# Patient Record
Sex: MALE | Race: WHITE | Employment: OTHER | URBAN - METROPOLITAN AREA
[De-identification: names, ages, dates, MRNs, and addresses within clinical notes are randomized per-mention and may not be internally consistent; named-entity substitution may affect disease eponyms.]

---

## 2017-01-01 ENCOUNTER — OFFICE VISIT (OUTPATIENT)
Dept: FAMILY MEDICINE CLINIC | Facility: CLINIC | Age: 67
End: 2017-01-01

## 2017-01-01 ENCOUNTER — OFFICE VISIT (OUTPATIENT)
Dept: HEMATOLOGY/ONCOLOGY | Age: 67
End: 2017-01-01
Attending: INTERNAL MEDICINE
Payer: MEDICARE

## 2017-01-01 ENCOUNTER — SNF/IP PROF CHARGE ONLY (OUTPATIENT)
Dept: HEMATOLOGY/ONCOLOGY | Facility: HOSPITAL | Age: 67
End: 2017-01-01

## 2017-01-01 ENCOUNTER — NURSE ONLY (OUTPATIENT)
Dept: HEMATOLOGY/ONCOLOGY | Age: 67
End: 2017-01-01
Attending: INTERNAL MEDICINE
Payer: MEDICARE

## 2017-01-01 ENCOUNTER — TELEPHONE (OUTPATIENT)
Dept: HEMATOLOGY/ONCOLOGY | Facility: HOSPITAL | Age: 67
End: 2017-01-01

## 2017-01-01 ENCOUNTER — HOSPITAL ENCOUNTER (OUTPATIENT)
Dept: CT IMAGING | Age: 67
Discharge: HOME | End: 2017-01-01
Attending: INTERNAL MEDICINE
Payer: MEDICARE

## 2017-01-01 ENCOUNTER — HOSPITAL ENCOUNTER (OUTPATIENT)
Dept: CT IMAGING | Facility: HOSPITAL | Age: 67
Discharge: HOME | End: 2017-01-01
Attending: INTERNAL MEDICINE
Payer: MEDICARE

## 2017-01-01 ENCOUNTER — APPOINTMENT (OUTPATIENT)
Dept: HEMATOLOGY/ONCOLOGY | Age: 67
End: 2017-01-01
Attending: INTERNAL MEDICINE
Payer: MEDICARE

## 2017-01-01 ENCOUNTER — LAB ENCOUNTER (OUTPATIENT)
Dept: LAB | Facility: HOSPITAL | Age: 67
End: 2017-01-01
Attending: INTERNAL MEDICINE
Payer: MEDICARE

## 2017-01-01 ENCOUNTER — OFFICE VISIT (OUTPATIENT)
Dept: HEMATOLOGY/ONCOLOGY | Facility: HOSPITAL | Age: 67
End: 2017-01-01
Attending: INTERNAL MEDICINE
Payer: MEDICARE

## 2017-01-01 VITALS
RESPIRATION RATE: 18 BRPM | DIASTOLIC BLOOD PRESSURE: 89 MMHG | OXYGEN SATURATION: 98 % | BODY MASS INDEX: 25 KG/M2 | WEIGHT: 171 LBS | HEART RATE: 97 BPM | SYSTOLIC BLOOD PRESSURE: 145 MMHG | TEMPERATURE: 97 F

## 2017-01-01 VITALS
OXYGEN SATURATION: 98 % | WEIGHT: 167.81 LBS | WEIGHT: 156.38 LBS | SYSTOLIC BLOOD PRESSURE: 133 MMHG | BODY MASS INDEX: 22.9 KG/M2 | DIASTOLIC BLOOD PRESSURE: 86 MMHG | HEART RATE: 75 BPM | RESPIRATION RATE: 18 BRPM | HEIGHT: 69.29 IN | DIASTOLIC BLOOD PRESSURE: 82 MMHG | RESPIRATION RATE: 16 BRPM | HEIGHT: 69.29 IN | BODY MASS INDEX: 24.57 KG/M2 | TEMPERATURE: 97 F | TEMPERATURE: 98 F | OXYGEN SATURATION: 99 % | SYSTOLIC BLOOD PRESSURE: 147 MMHG | HEART RATE: 86 BPM

## 2017-01-01 VITALS
WEIGHT: 167 LBS | OXYGEN SATURATION: 96 % | DIASTOLIC BLOOD PRESSURE: 66 MMHG | RESPIRATION RATE: 18 BRPM | HEART RATE: 93 BPM | SYSTOLIC BLOOD PRESSURE: 110 MMHG | HEIGHT: 72 IN | TEMPERATURE: 99 F | BODY MASS INDEX: 22.62 KG/M2

## 2017-01-01 VITALS
TEMPERATURE: 98 F | SYSTOLIC BLOOD PRESSURE: 137 MMHG | BODY MASS INDEX: 23.99 KG/M2 | RESPIRATION RATE: 18 BRPM | OXYGEN SATURATION: 99 % | DIASTOLIC BLOOD PRESSURE: 83 MMHG | HEIGHT: 69.29 IN | WEIGHT: 163.81 LBS | HEART RATE: 96 BPM

## 2017-01-01 VITALS
BODY MASS INDEX: 25 KG/M2 | WEIGHT: 173.38 LBS | SYSTOLIC BLOOD PRESSURE: 126 MMHG | HEART RATE: 80 BPM | RESPIRATION RATE: 16 BRPM | DIASTOLIC BLOOD PRESSURE: 76 MMHG | TEMPERATURE: 98 F | OXYGEN SATURATION: 99 %

## 2017-01-01 VITALS
HEART RATE: 94 BPM | SYSTOLIC BLOOD PRESSURE: 137 MMHG | DIASTOLIC BLOOD PRESSURE: 81 MMHG | RESPIRATION RATE: 18 BRPM | OXYGEN SATURATION: 96 % | WEIGHT: 171.88 LBS | BODY MASS INDEX: 25 KG/M2 | TEMPERATURE: 98 F

## 2017-01-01 VITALS
OXYGEN SATURATION: 99 % | SYSTOLIC BLOOD PRESSURE: 121 MMHG | HEART RATE: 109 BPM | RESPIRATION RATE: 18 BRPM | BODY MASS INDEX: 25 KG/M2 | WEIGHT: 168 LBS | DIASTOLIC BLOOD PRESSURE: 80 MMHG | TEMPERATURE: 100 F

## 2017-01-01 VITALS
WEIGHT: 172.81 LBS | BODY MASS INDEX: 25 KG/M2 | TEMPERATURE: 98 F | RESPIRATION RATE: 18 BRPM | HEART RATE: 82 BPM | OXYGEN SATURATION: 97 % | SYSTOLIC BLOOD PRESSURE: 128 MMHG | DIASTOLIC BLOOD PRESSURE: 78 MMHG

## 2017-01-01 VITALS
WEIGHT: 163.5 LBS | SYSTOLIC BLOOD PRESSURE: 107 MMHG | RESPIRATION RATE: 16 BRPM | BODY MASS INDEX: 24 KG/M2 | OXYGEN SATURATION: 99 % | HEART RATE: 93 BPM | DIASTOLIC BLOOD PRESSURE: 67 MMHG | TEMPERATURE: 97 F

## 2017-01-01 VITALS
OXYGEN SATURATION: 97 % | DIASTOLIC BLOOD PRESSURE: 90 MMHG | RESPIRATION RATE: 16 BRPM | HEART RATE: 90 BPM | BODY MASS INDEX: 25 KG/M2 | SYSTOLIC BLOOD PRESSURE: 162 MMHG | WEIGHT: 173 LBS

## 2017-01-01 VITALS
DIASTOLIC BLOOD PRESSURE: 69 MMHG | HEIGHT: 69.29 IN | OXYGEN SATURATION: 98 % | TEMPERATURE: 98 F | SYSTOLIC BLOOD PRESSURE: 105 MMHG | HEART RATE: 97 BPM | BODY MASS INDEX: 23.87 KG/M2 | WEIGHT: 163 LBS | RESPIRATION RATE: 16 BRPM

## 2017-01-01 VITALS
DIASTOLIC BLOOD PRESSURE: 87 MMHG | SYSTOLIC BLOOD PRESSURE: 157 MMHG | HEART RATE: 81 BPM | WEIGHT: 174 LBS | TEMPERATURE: 97 F | RESPIRATION RATE: 18 BRPM | OXYGEN SATURATION: 97 % | BODY MASS INDEX: 25 KG/M2

## 2017-01-01 VITALS
OXYGEN SATURATION: 100 % | TEMPERATURE: 98 F | DIASTOLIC BLOOD PRESSURE: 90 MMHG | BODY MASS INDEX: 24 KG/M2 | RESPIRATION RATE: 18 BRPM | HEART RATE: 88 BPM | WEIGHT: 164.19 LBS | SYSTOLIC BLOOD PRESSURE: 151 MMHG

## 2017-01-01 VITALS
BODY MASS INDEX: 25.02 KG/M2 | WEIGHT: 170.88 LBS | DIASTOLIC BLOOD PRESSURE: 85 MMHG | HEART RATE: 78 BPM | TEMPERATURE: 98 F | SYSTOLIC BLOOD PRESSURE: 150 MMHG | OXYGEN SATURATION: 97 % | HEIGHT: 69.29 IN | RESPIRATION RATE: 16 BRPM

## 2017-01-01 VITALS
DIASTOLIC BLOOD PRESSURE: 76 MMHG | RESPIRATION RATE: 16 BRPM | HEART RATE: 89 BPM | OXYGEN SATURATION: 100 % | SYSTOLIC BLOOD PRESSURE: 118 MMHG | WEIGHT: 156.88 LBS | TEMPERATURE: 98 F | BODY MASS INDEX: 23 KG/M2

## 2017-01-01 VITALS
TEMPERATURE: 97 F | WEIGHT: 167.38 LBS | DIASTOLIC BLOOD PRESSURE: 83 MMHG | OXYGEN SATURATION: 99 % | HEIGHT: 69.29 IN | HEART RATE: 79 BPM | BODY MASS INDEX: 24.51 KG/M2 | SYSTOLIC BLOOD PRESSURE: 131 MMHG | RESPIRATION RATE: 18 BRPM

## 2017-01-01 VITALS
BODY MASS INDEX: 22 KG/M2 | TEMPERATURE: 98 F | HEART RATE: 91 BPM | WEIGHT: 163 LBS | RESPIRATION RATE: 18 BRPM | DIASTOLIC BLOOD PRESSURE: 81 MMHG | SYSTOLIC BLOOD PRESSURE: 127 MMHG

## 2017-01-01 VITALS
SYSTOLIC BLOOD PRESSURE: 150 MMHG | BODY MASS INDEX: 25.19 KG/M2 | TEMPERATURE: 97 F | HEART RATE: 107 BPM | RESPIRATION RATE: 18 BRPM | OXYGEN SATURATION: 96 % | WEIGHT: 172 LBS | HEIGHT: 69.29 IN | DIASTOLIC BLOOD PRESSURE: 89 MMHG

## 2017-01-01 VITALS
SYSTOLIC BLOOD PRESSURE: 124 MMHG | WEIGHT: 172.19 LBS | BODY MASS INDEX: 25 KG/M2 | OXYGEN SATURATION: 97 % | DIASTOLIC BLOOD PRESSURE: 73 MMHG | HEART RATE: 79 BPM | RESPIRATION RATE: 18 BRPM | TEMPERATURE: 98 F

## 2017-01-01 DIAGNOSIS — C19 CANCER OF RECTOSIGMOID (COLON) (HCC): ICD-10-CM

## 2017-01-01 DIAGNOSIS — C78.7 LIVER METASTASES (HCC): ICD-10-CM

## 2017-01-01 DIAGNOSIS — C78.7 COLON CANCER METASTASIZED TO LIVER (HCC): ICD-10-CM

## 2017-01-01 DIAGNOSIS — C18.8 OVERLAPPING MALIGNANT NEOPLASM OF COLON (HCC): Primary | ICD-10-CM

## 2017-01-01 DIAGNOSIS — C79.51 BONE METASTASES (HCC): ICD-10-CM

## 2017-01-01 DIAGNOSIS — C18.9 MALIGNANT NEOPLASM OF COLON, UNSPECIFIED PART OF COLON (HCC): ICD-10-CM

## 2017-01-01 DIAGNOSIS — T45.1X5A CHEMOTHERAPY-INDUCED THROMBOCYTOPENIA: ICD-10-CM

## 2017-01-01 DIAGNOSIS — C18.9 COLON CANCER METASTASIZED TO LIVER (HCC): ICD-10-CM

## 2017-01-01 DIAGNOSIS — I26.99 OTHER ACUTE PULMONARY EMBOLISM WITHOUT ACUTE COR PULMONALE (HCC): ICD-10-CM

## 2017-01-01 DIAGNOSIS — C18.9 COLON CANCER METASTASIZED TO LIVER (HCC): Primary | ICD-10-CM

## 2017-01-01 DIAGNOSIS — C18.8 OVERLAPPING MALIGNANT NEOPLASM OF COLON (HCC): ICD-10-CM

## 2017-01-01 DIAGNOSIS — C19 CANCER OF RECTOSIGMOID (COLON) (HCC): Primary | ICD-10-CM

## 2017-01-01 DIAGNOSIS — C78.7 COLON CANCER METASTASIZED TO LIVER (HCC): Primary | ICD-10-CM

## 2017-01-01 DIAGNOSIS — D69.59 CHEMOTHERAPY-INDUCED THROMBOCYTOPENIA: ICD-10-CM

## 2017-01-01 DIAGNOSIS — I27.82 CHRONIC SADDLE PULMONARY EMBOLUS WITHOUT ACUTE COR PULMONALE (HCC): ICD-10-CM

## 2017-01-01 DIAGNOSIS — C18.9 MALIGNANT NEOPLASM OF COLON, UNSPECIFIED PART OF COLON (HCC): Primary | ICD-10-CM

## 2017-01-01 DIAGNOSIS — C79.51 BONE METASTASES (HCC): Primary | ICD-10-CM

## 2017-01-01 DIAGNOSIS — T45.1X5A ANEMIA ASSOCIATED WITH CHEMOTHERAPY: ICD-10-CM

## 2017-01-01 DIAGNOSIS — R04.0 EPISTAXIS: ICD-10-CM

## 2017-01-01 DIAGNOSIS — M25.512 CHRONIC LEFT SHOULDER PAIN: ICD-10-CM

## 2017-01-01 DIAGNOSIS — E83.42 HYPOMAGNESEMIA: ICD-10-CM

## 2017-01-01 DIAGNOSIS — E83.42 HYPOMAGNESEMIA: Primary | ICD-10-CM

## 2017-01-01 DIAGNOSIS — T45.1X5A ANEMIA DUE TO ANTINEOPLASTIC CHEMOTHERAPY: ICD-10-CM

## 2017-01-01 DIAGNOSIS — D64.81 ANEMIA ASSOCIATED WITH CHEMOTHERAPY: ICD-10-CM

## 2017-01-01 DIAGNOSIS — C78.7 LIVER METASTASES (HCC): Primary | ICD-10-CM

## 2017-01-01 DIAGNOSIS — D69.6 THROMBOCYTOPENIA (HCC): ICD-10-CM

## 2017-01-01 DIAGNOSIS — D63.0 ANEMIA IN NEOPLASTIC DISEASE: ICD-10-CM

## 2017-01-01 DIAGNOSIS — I27.82 OTHER CHRONIC PULMONARY EMBOLISM WITHOUT ACUTE COR PULMONALE (HCC): ICD-10-CM

## 2017-01-01 DIAGNOSIS — I10 HTN, GOAL BELOW 130/80: ICD-10-CM

## 2017-01-01 DIAGNOSIS — Z86.711 HISTORY OF PULMONARY EMBOLISM: ICD-10-CM

## 2017-01-01 DIAGNOSIS — G89.29 CHRONIC LEFT SHOULDER PAIN: ICD-10-CM

## 2017-01-01 DIAGNOSIS — Z01.89 ROUTINE LAB DRAW: Primary | ICD-10-CM

## 2017-01-01 DIAGNOSIS — T50.905A ADVERSE DRUG REACTION, INITIAL ENCOUNTER: ICD-10-CM

## 2017-01-01 DIAGNOSIS — Z79.01 CHRONIC ANTICOAGULATION: ICD-10-CM

## 2017-01-01 DIAGNOSIS — D64.81 ANEMIA DUE TO ANTINEOPLASTIC CHEMOTHERAPY: ICD-10-CM

## 2017-01-01 DIAGNOSIS — I27.82 OTHER CHRONIC PULMONARY EMBOLISM WITHOUT ACUTE COR PULMONALE (HCC): Primary | ICD-10-CM

## 2017-01-01 DIAGNOSIS — I26.92 CHRONIC SADDLE PULMONARY EMBOLUS WITHOUT ACUTE COR PULMONALE (HCC): ICD-10-CM

## 2017-01-01 LAB
ALBUMIN SERPL-MCNC: 3.1 G/DL (ref 3.5–4.8)
ALBUMIN SERPL-MCNC: 3.2 G/DL (ref 3.5–4.8)
ALBUMIN SERPL-MCNC: 3.3 G/DL (ref 3.5–4.8)
ALBUMIN SERPL-MCNC: 3.4 G/DL (ref 3.5–4.8)
ALBUMIN SERPL-MCNC: 3.5 G/DL (ref 3.5–4.8)
ALP LIVER SERPL-CCNC: 236 U/L (ref 45–117)
ALP LIVER SERPL-CCNC: 240 U/L (ref 45–117)
ALP LIVER SERPL-CCNC: 243 U/L (ref 45–117)
ALP LIVER SERPL-CCNC: 244 U/L (ref 45–117)
ALP LIVER SERPL-CCNC: 256 U/L (ref 45–117)
ALP LIVER SERPL-CCNC: 257 U/L (ref 45–117)
ALP LIVER SERPL-CCNC: 298 U/L (ref 45–117)
ALT SERPL-CCNC: 20 U/L (ref 17–63)
ALT SERPL-CCNC: 22 U/L (ref 17–63)
ALT SERPL-CCNC: 23 U/L (ref 17–63)
ALT SERPL-CCNC: 24 U/L (ref 17–63)
ALT SERPL-CCNC: 26 U/L (ref 17–63)
AST SERPL-CCNC: 21 U/L (ref 15–41)
AST SERPL-CCNC: 21 U/L (ref 15–41)
AST SERPL-CCNC: 22 U/L (ref 15–41)
AST SERPL-CCNC: 24 U/L (ref 15–41)
AST SERPL-CCNC: 25 U/L (ref 15–41)
AST SERPL-CCNC: 25 U/L (ref 15–41)
AST SERPL-CCNC: 26 U/L (ref 15–41)
BASOPHILS # BLD AUTO: 0.03 X10(3) UL (ref 0–0.1)
BASOPHILS # BLD AUTO: 0.04 X10(3) UL (ref 0–0.1)
BASOPHILS # BLD AUTO: 0.04 X10(3) UL (ref 0–0.1)
BASOPHILS NFR BLD AUTO: 0.4 %
BASOPHILS NFR BLD AUTO: 0.5 %
BASOPHILS NFR BLD AUTO: 0.5 %
BASOPHILS NFR BLD AUTO: 0.7 %
BASOPHILS NFR BLD AUTO: 0.7 %
BILIRUB SERPL-MCNC: 0.3 MG/DL (ref 0.1–2)
BILIRUB SERPL-MCNC: 0.4 MG/DL (ref 0.1–2)
BILIRUB SERPL-MCNC: 0.5 MG/DL (ref 0.1–2)
BUN BLD-MCNC: 10 MG/DL (ref 8–20)
BUN BLD-MCNC: 11 MG/DL (ref 8–20)
BUN BLD-MCNC: 16 MG/DL (ref 8–20)
BUN BLD-MCNC: 7 MG/DL (ref 8–20)
BUN BLD-MCNC: 9 MG/DL (ref 8–20)
CALCIUM BLD-MCNC: 8.7 MG/DL (ref 8.3–10.3)
CALCIUM BLD-MCNC: 8.8 MG/DL (ref 8.3–10.3)
CALCIUM BLD-MCNC: 9 MG/DL (ref 8.3–10.3)
CALCIUM BLD-MCNC: 9.1 MG/DL (ref 8.3–10.3)
CALCIUM BLD-MCNC: 9.3 MG/DL (ref 8.3–10.3)
CEA: 1730 NG/ML (ref 0.5–5)
CEA: 412.1 NG/ML (ref 0.5–5)
CEA: 450 NG/ML (ref 0.5–5)
CEA: 505.2 NG/ML (ref 0.5–5)
CEA: 595.3 NG/ML (ref 0.5–5)
CEA: 667.8 NG/ML (ref 0.5–5)
CEA: 987.3 NG/ML (ref 0.5–5)
CHLORIDE: 108 MMOL/L (ref 101–111)
CHLORIDE: 109 MMOL/L (ref 101–111)
CHLORIDE: 110 MMOL/L (ref 101–111)
CHLORIDE: 111 MMOL/L (ref 101–111)
CHLORIDE: 111 MMOL/L (ref 101–111)
CO2: 24 MMOL/L (ref 22–32)
CO2: 25 MMOL/L (ref 22–32)
CO2: 26 MMOL/L (ref 22–32)
CO2: 27 MMOL/L (ref 22–32)
CREAT BLD-MCNC: 0.66 MG/DL (ref 0.7–1.3)
CREAT BLD-MCNC: 0.66 MG/DL (ref 0.7–1.3)
CREAT BLD-MCNC: 0.68 MG/DL (ref 0.7–1.3)
CREAT BLD-MCNC: 0.7 MG/DL (ref 0.7–1.3)
CREAT BLD-MCNC: 0.71 MG/DL (ref 0.7–1.3)
CREAT BLD-MCNC: 0.72 MG/DL (ref 0.7–1.3)
CREAT BLD-MCNC: 0.81 MG/DL (ref 0.7–1.3)
EOSINOPHIL # BLD AUTO: 0.11 X10(3) UL (ref 0–0.3)
EOSINOPHIL # BLD AUTO: 0.15 X10(3) UL (ref 0–0.3)
EOSINOPHIL # BLD AUTO: 0.17 X10(3) UL (ref 0–0.3)
EOSINOPHIL # BLD AUTO: 0.18 X10(3) UL (ref 0–0.3)
EOSINOPHIL # BLD AUTO: 0.21 X10(3) UL (ref 0–0.3)
EOSINOPHIL # BLD AUTO: 0.25 X10(3) UL (ref 0–0.3)
EOSINOPHIL # BLD AUTO: 0.27 X10(3) UL (ref 0–0.3)
EOSINOPHIL NFR BLD AUTO: 1.8 %
EOSINOPHIL NFR BLD AUTO: 2.5 %
EOSINOPHIL NFR BLD AUTO: 2.9 %
EOSINOPHIL NFR BLD AUTO: 2.9 %
EOSINOPHIL NFR BLD AUTO: 3.3 %
EOSINOPHIL NFR BLD AUTO: 3.5 %
EOSINOPHIL NFR BLD AUTO: 3.7 %
ERYTHROCYTE [DISTWIDTH] IN BLOOD BY AUTOMATED COUNT: 17.7 % (ref 11.5–16)
ERYTHROCYTE [DISTWIDTH] IN BLOOD BY AUTOMATED COUNT: 17.9 % (ref 11.5–16)
ERYTHROCYTE [DISTWIDTH] IN BLOOD BY AUTOMATED COUNT: 18.9 % (ref 11.5–16)
ERYTHROCYTE [DISTWIDTH] IN BLOOD BY AUTOMATED COUNT: 19 % (ref 11.5–16)
ERYTHROCYTE [DISTWIDTH] IN BLOOD BY AUTOMATED COUNT: 19.1 % (ref 11.5–16)
ERYTHROCYTE [DISTWIDTH] IN BLOOD BY AUTOMATED COUNT: 19.2 % (ref 11.5–16)
ERYTHROCYTE [DISTWIDTH] IN BLOOD BY AUTOMATED COUNT: 19.4 % (ref 11.5–16)
GLUCOSE BLD-MCNC: 100 MG/DL (ref 70–99)
GLUCOSE BLD-MCNC: 119 MG/DL (ref 70–99)
GLUCOSE BLD-MCNC: 147 MG/DL (ref 70–99)
GLUCOSE BLD-MCNC: 152 MG/DL (ref 70–99)
GLUCOSE BLD-MCNC: 154 MG/DL (ref 70–99)
GLUCOSE BLD-MCNC: 88 MG/DL (ref 70–99)
GLUCOSE BLD-MCNC: 96 MG/DL (ref 70–99)
HCT VFR BLD AUTO: 34.7 % (ref 37–53)
HCT VFR BLD AUTO: 35.5 % (ref 37–53)
HCT VFR BLD AUTO: 35.5 % (ref 37–53)
HCT VFR BLD AUTO: 35.6 % (ref 37–53)
HCT VFR BLD AUTO: 36.5 % (ref 37–53)
HCT VFR BLD AUTO: 36.7 % (ref 37–53)
HCT VFR BLD AUTO: 37.2 % (ref 37–53)
HGB BLD-MCNC: 11.4 G/DL (ref 13–17)
HGB BLD-MCNC: 11.6 G/DL (ref 13–17)
HGB BLD-MCNC: 11.8 G/DL (ref 13–17)
HGB BLD-MCNC: 12 G/DL (ref 13–17)
HGB BLD-MCNC: 12 G/DL (ref 13–17)
HGB BLD-MCNC: 12.1 G/DL (ref 13–17)
HGB BLD-MCNC: 12.4 G/DL (ref 13–17)
IMMATURE GRANULOCYTE COUNT: 0.01 X10(3) UL (ref 0–1)
IMMATURE GRANULOCYTE COUNT: 0.02 X10(3) UL (ref 0–1)
IMMATURE GRANULOCYTE RATIO %: 0.1 %
IMMATURE GRANULOCYTE RATIO %: 0.1 %
IMMATURE GRANULOCYTE RATIO %: 0.2 %
IMMATURE GRANULOCYTE RATIO %: 0.3 %
LYMPHOCYTES # BLD AUTO: 1.16 X10(3) UL (ref 0.9–4)
LYMPHOCYTES # BLD AUTO: 1.24 X10(3) UL (ref 0.9–4)
LYMPHOCYTES # BLD AUTO: 1.26 X10(3) UL (ref 0.9–4)
LYMPHOCYTES # BLD AUTO: 1.5 X10(3) UL (ref 0.9–4)
LYMPHOCYTES # BLD AUTO: 1.54 X10(3) UL (ref 0.9–4)
LYMPHOCYTES # BLD AUTO: 1.58 X10(3) UL (ref 0.9–4)
LYMPHOCYTES # BLD AUTO: 1.6 X10(3) UL (ref 0.9–4)
LYMPHOCYTES NFR BLD AUTO: 19.9 %
LYMPHOCYTES NFR BLD AUTO: 21 %
LYMPHOCYTES NFR BLD AUTO: 21.2 %
LYMPHOCYTES NFR BLD AUTO: 21.6 %
LYMPHOCYTES NFR BLD AUTO: 22.3 %
LYMPHOCYTES NFR BLD AUTO: 22.4 %
LYMPHOCYTES NFR BLD AUTO: 25.2 %
M PROTEIN MFR SERPL ELPH: 6.7 G/DL (ref 6.1–8.3)
M PROTEIN MFR SERPL ELPH: 6.8 G/DL (ref 6.1–8.3)
M PROTEIN MFR SERPL ELPH: 6.9 G/DL (ref 6.1–8.3)
M PROTEIN MFR SERPL ELPH: 6.9 G/DL (ref 6.1–8.3)
M PROTEIN MFR SERPL ELPH: 7 G/DL (ref 6.1–8.3)
M PROTEIN MFR SERPL ELPH: 7 G/DL (ref 6.1–8.3)
M PROTEIN MFR SERPL ELPH: 7.1 G/DL (ref 6.1–8.3)
MCH RBC QN AUTO: 31.2 PG (ref 27–33.2)
MCH RBC QN AUTO: 31.2 PG (ref 27–33.2)
MCH RBC QN AUTO: 31.4 PG (ref 27–33.2)
MCH RBC QN AUTO: 31.5 PG (ref 27–33.2)
MCH RBC QN AUTO: 31.9 PG (ref 27–33.2)
MCH RBC QN AUTO: 32 PG (ref 27–33.2)
MCH RBC QN AUTO: 32.8 PG (ref 27–33.2)
MCHC RBC AUTO-ENTMCNC: 32.7 G/DL (ref 31–37)
MCHC RBC AUTO-ENTMCNC: 32.9 G/DL (ref 31–37)
MCHC RBC AUTO-ENTMCNC: 32.9 G/DL (ref 31–37)
MCHC RBC AUTO-ENTMCNC: 33 G/DL (ref 31–37)
MCHC RBC AUTO-ENTMCNC: 33.1 G/DL (ref 31–37)
MCHC RBC AUTO-ENTMCNC: 33.3 G/DL (ref 31–37)
MCHC RBC AUTO-ENTMCNC: 33.8 G/DL (ref 31–37)
MCV RBC AUTO: 94.2 FL (ref 80–99)
MCV RBC AUTO: 95.1 FL (ref 80–99)
MCV RBC AUTO: 95.6 FL (ref 80–99)
MCV RBC AUTO: 95.9 FL (ref 80–99)
MCV RBC AUTO: 96.2 FL (ref 80–99)
MCV RBC AUTO: 97 FL (ref 80–99)
MCV RBC AUTO: 97.1 FL (ref 80–99)
MONOCYTES # BLD AUTO: 0.97 X10(3) UL (ref 0.1–0.6)
MONOCYTES # BLD AUTO: 0.99 X10(3) UL (ref 0.1–0.6)
MONOCYTES # BLD AUTO: 1 X10(3) UL (ref 0.1–0.6)
MONOCYTES # BLD AUTO: 1.01 X10(3) UL (ref 0.1–0.6)
MONOCYTES # BLD AUTO: 1.16 X10(3) UL (ref 0.1–0.6)
MONOCYTES # BLD AUTO: 1.2 X10(3) UL (ref 0.1–0.6)
MONOCYTES # BLD AUTO: 1.22 X10(3) UL (ref 0.1–0.6)
MONOCYTES NFR BLD AUTO: 14 %
MONOCYTES NFR BLD AUTO: 15.8 %
MONOCYTES NFR BLD AUTO: 16.7 %
MONOCYTES NFR BLD AUTO: 16.7 %
MONOCYTES NFR BLD AUTO: 17 %
MONOCYTES NFR BLD AUTO: 17 %
MONOCYTES NFR BLD AUTO: 21 %
NEUTROPHIL ABS PRELIM: 2.9 X10 (3) UL (ref 1.3–6.7)
NEUTROPHIL ABS PRELIM: 3.46 X10 (3) UL (ref 1.3–6.7)
NEUTROPHIL ABS PRELIM: 3.47 X10 (3) UL (ref 1.3–6.7)
NEUTROPHIL ABS PRELIM: 3.48 X10 (3) UL (ref 1.3–6.7)
NEUTROPHIL ABS PRELIM: 4.12 X10 (3) UL (ref 1.3–6.7)
NEUTROPHIL ABS PRELIM: 4.19 X10 (3) UL (ref 1.3–6.7)
NEUTROPHIL ABS PRELIM: 4.36 X10 (3) UL (ref 1.3–6.7)
NEUTROPHILS # BLD AUTO: 2.9 X10(3) UL (ref 1.3–6.7)
NEUTROPHILS # BLD AUTO: 3.46 X10(3) UL (ref 1.3–6.7)
NEUTROPHILS # BLD AUTO: 3.47 X10(3) UL (ref 1.3–6.7)
NEUTROPHILS # BLD AUTO: 3.48 X10(3) UL (ref 1.3–6.7)
NEUTROPHILS # BLD AUTO: 4.12 X10(3) UL (ref 1.3–6.7)
NEUTROPHILS # BLD AUTO: 4.19 X10(3) UL (ref 1.3–6.7)
NEUTROPHILS # BLD AUTO: 4.36 X10(3) UL (ref 1.3–6.7)
NEUTROPHILS NFR BLD AUTO: 52.4 %
NEUTROPHILS NFR BLD AUTO: 56.5 %
NEUTROPHILS NFR BLD AUTO: 57.3 %
NEUTROPHILS NFR BLD AUTO: 57.6 %
NEUTROPHILS NFR BLD AUTO: 58.4 %
NEUTROPHILS NFR BLD AUTO: 59.5 %
NEUTROPHILS NFR BLD AUTO: 61 %
PLATELET # BLD AUTO: 102 10(3)UL (ref 150–450)
PLATELET # BLD AUTO: 102 10(3)UL (ref 150–450)
PLATELET # BLD AUTO: 134 10(3)UL (ref 150–450)
PLATELET # BLD AUTO: 70 10(3)UL (ref 150–450)
PLATELET # BLD AUTO: 82 10(3)UL (ref 150–450)
PLATELET # BLD AUTO: 93 10(3)UL (ref 150–450)
PLATELET # BLD AUTO: 98 10(3)UL (ref 150–450)
PLATELET MORPHOLOGY: NORMAL
POTASSIUM SERPL-SCNC: 3.8 MMOL/L (ref 3.6–5.1)
POTASSIUM SERPL-SCNC: 3.9 MMOL/L (ref 3.6–5.1)
POTASSIUM SERPL-SCNC: 3.9 MMOL/L (ref 3.6–5.1)
POTASSIUM SERPL-SCNC: 4 MMOL/L (ref 3.6–5.1)
POTASSIUM SERPL-SCNC: 4.1 MMOL/L (ref 3.6–5.1)
RBC # BLD AUTO: 3.65 X10(6)UL (ref 3.8–5.8)
RBC # BLD AUTO: 3.66 X10(6)UL (ref 3.8–5.8)
RBC # BLD AUTO: 3.69 X10(6)UL (ref 3.8–5.8)
RBC # BLD AUTO: 3.76 X10(6)UL (ref 3.8–5.8)
RBC # BLD AUTO: 3.78 X10(6)UL (ref 3.8–5.8)
RBC # BLD AUTO: 3.84 X10(6)UL (ref 3.8–5.8)
RBC # BLD AUTO: 3.88 X10(6)UL (ref 3.8–5.8)
RED CELL DISTRIBUTION WIDTH-SD: 62.9 FL (ref 35.1–46.3)
RED CELL DISTRIBUTION WIDTH-SD: 63.3 FL (ref 35.1–46.3)
RED CELL DISTRIBUTION WIDTH-SD: 66.6 FL (ref 35.1–46.3)
RED CELL DISTRIBUTION WIDTH-SD: 66.9 FL (ref 35.1–46.3)
RED CELL DISTRIBUTION WIDTH-SD: 67.4 FL (ref 35.1–46.3)
RED CELL DISTRIBUTION WIDTH-SD: 67.6 FL (ref 35.1–46.3)
RED CELL DISTRIBUTION WIDTH-SD: 68.5 FL (ref 35.1–46.3)
SODIUM SERPL-SCNC: 139 MMOL/L (ref 136–144)
SODIUM SERPL-SCNC: 140 MMOL/L (ref 136–144)
SODIUM SERPL-SCNC: 141 MMOL/L (ref 136–144)
SODIUM SERPL-SCNC: 141 MMOL/L (ref 136–144)
SODIUM SERPL-SCNC: 142 MMOL/L (ref 136–144)
SODIUM SERPL-SCNC: 143 MMOL/L (ref 136–144)
SODIUM SERPL-SCNC: 144 MMOL/L (ref 136–144)
WBC # BLD AUTO: 5.5 X10(3) UL (ref 4–13)
WBC # BLD AUTO: 5.8 X10(3) UL (ref 4–13)
WBC # BLD AUTO: 6 X10(3) UL (ref 4–13)
WBC # BLD AUTO: 6.1 X10(3) UL (ref 4–13)
WBC # BLD AUTO: 7.2 X10(3) UL (ref 4–13)
WBC # BLD AUTO: 7.2 X10(3) UL (ref 4–13)
WBC # BLD AUTO: 7.3 X10(3) UL (ref 4–13)

## 2017-01-01 PROCEDURE — 99213 OFFICE O/P EST LOW 20 MIN: CPT | Performed by: INTERNAL MEDICINE

## 2017-01-01 PROCEDURE — 96415 CHEMO IV INFUSION ADDL HR: CPT

## 2017-01-01 PROCEDURE — 85025 COMPLETE CBC W/AUTO DIFF WBC: CPT

## 2017-01-01 PROCEDURE — 80053 COMPREHEN METABOLIC PANEL: CPT

## 2017-01-01 PROCEDURE — 99214 OFFICE O/P EST MOD 30 MIN: CPT | Performed by: INTERNAL MEDICINE

## 2017-01-01 PROCEDURE — 96416 CHEMO PROLONG INFUSE W/PUMP: CPT

## 2017-01-01 PROCEDURE — 96375 TX/PRO/DX INJ NEW DRUG ADDON: CPT

## 2017-01-01 PROCEDURE — 96411 CHEMO IV PUSH ADDL DRUG: CPT

## 2017-01-01 PROCEDURE — 96413 CHEMO IV INFUSION 1 HR: CPT

## 2017-01-01 PROCEDURE — 82565 ASSAY OF CREATININE: CPT

## 2017-01-01 PROCEDURE — G9678 ONCOLOGY CARE MODEL SERVICE: HCPCS | Performed by: INTERNAL MEDICINE

## 2017-01-01 PROCEDURE — 82378 CARCINOEMBRYONIC ANTIGEN: CPT

## 2017-01-01 PROCEDURE — 96368 THER/DIAG CONCURRENT INF: CPT

## 2017-01-01 PROCEDURE — 96523 IRRIG DRUG DELIVERY DEVICE: CPT

## 2017-01-01 PROCEDURE — 71260 CT THORAX DX C+: CPT | Performed by: INTERNAL MEDICINE

## 2017-01-01 PROCEDURE — 82310 ASSAY OF CALCIUM: CPT

## 2017-01-01 PROCEDURE — 96365 THER/PROPH/DIAG IV INF INIT: CPT

## 2017-01-01 PROCEDURE — 83735 ASSAY OF MAGNESIUM: CPT

## 2017-01-01 PROCEDURE — 36591 DRAW BLOOD OFF VENOUS DEVICE: CPT

## 2017-01-01 PROCEDURE — 74177 CT ABD & PELVIS W/CONTRAST: CPT | Performed by: INTERNAL MEDICINE

## 2017-01-01 PROCEDURE — 96367 TX/PROPH/DG ADDL SEQ IV INF: CPT

## 2017-01-01 PROCEDURE — 36593 DECLOT VASCULAR DEVICE: CPT

## 2017-01-01 PROCEDURE — 88341 IMHCHEM/IMCYTCHM EA ADD ANTB: CPT

## 2017-01-01 PROCEDURE — 96366 THER/PROPH/DIAG IV INF ADDON: CPT

## 2017-01-01 PROCEDURE — 99211 OFF/OP EST MAY X REQ PHY/QHP: CPT

## 2017-01-01 PROCEDURE — 88342 IMHCHEM/IMCYTCHM 1ST ANTB: CPT

## 2017-01-01 PROCEDURE — 84443 ASSAY THYROID STIM HORMONE: CPT

## 2017-01-01 PROCEDURE — 99495 TRANSJ CARE MGMT MOD F2F 14D: CPT | Performed by: FAMILY MEDICINE

## 2017-01-01 RX ORDER — SODIUM CHLORIDE 0.9 % (FLUSH) 0.9 %
10 SYRINGE (ML) INJECTION ONCE
Status: CANCELLED | OUTPATIENT
Start: 2017-01-01

## 2017-01-01 RX ORDER — SODIUM CHLORIDE 0.9 % (FLUSH) 0.9 %
10 SYRINGE (ML) INJECTION ONCE
Status: COMPLETED | OUTPATIENT
Start: 2017-01-01 | End: 2017-01-01

## 2017-01-01 RX ORDER — FLUOROURACIL 50 MG/ML
320 INJECTION, SOLUTION INTRAVENOUS ONCE
Status: COMPLETED | OUTPATIENT
Start: 2017-01-01 | End: 2017-01-01

## 2017-01-01 RX ORDER — DIPHENHYDRAMINE HYDROCHLORIDE 50 MG/ML
25 INJECTION INTRAMUSCULAR; INTRAVENOUS ONCE
Status: CANCELLED
Start: 2017-01-01 | End: 2017-01-01

## 2017-01-01 RX ORDER — LORAZEPAM 2 MG/ML
INJECTION INTRAMUSCULAR EVERY 4 HOURS PRN
Status: CANCELLED | OUTPATIENT
Start: 2017-01-01

## 2017-01-01 RX ORDER — ONDANSETRON 2 MG/ML
8 INJECTION INTRAMUSCULAR; INTRAVENOUS EVERY 6 HOURS PRN
Status: CANCELLED | OUTPATIENT
Start: 2017-01-01

## 2017-01-01 RX ORDER — LORAZEPAM 0.5 MG/1
TABLET ORAL EVERY 4 HOURS PRN
Status: CANCELLED | OUTPATIENT
Start: 2017-01-01

## 2017-01-01 RX ORDER — ALBUTEROL SULFATE 90 UG/1
2 AEROSOL, METERED RESPIRATORY (INHALATION) AS NEEDED
Status: CANCELLED | OUTPATIENT
Start: 2017-01-01

## 2017-01-01 RX ORDER — FLUOROURACIL 50 MG/ML
1920 INJECTION, SOLUTION INTRAVENOUS CONTINUOUS
Status: DISCONTINUED | OUTPATIENT
Start: 2017-01-01 | End: 2017-01-01

## 2017-01-01 RX ORDER — FLUOROURACIL 50 MG/ML
320 INJECTION, SOLUTION INTRAVENOUS ONCE
Status: CANCELLED | OUTPATIENT
Start: 2017-01-01

## 2017-01-01 RX ORDER — PROCHLORPERAZINE MALEATE 10 MG
10 TABLET ORAL EVERY 6 HOURS PRN
Status: CANCELLED | OUTPATIENT
Start: 2017-01-01

## 2017-01-01 RX ORDER — FLUOROURACIL 50 MG/ML
1920 INJECTION, SOLUTION INTRAVENOUS CONTINUOUS
Status: CANCELLED | OUTPATIENT
Start: 2017-01-01

## 2017-01-01 RX ORDER — ACETAMINOPHEN 325 MG/1
TABLET ORAL EVERY 6 HOURS PRN
Status: CANCELLED | OUTPATIENT
Start: 2017-01-01

## 2017-01-01 RX ORDER — METOCLOPRAMIDE HYDROCHLORIDE 5 MG/ML
10 INJECTION INTRAMUSCULAR; INTRAVENOUS EVERY 6 HOURS PRN
Status: CANCELLED | OUTPATIENT
Start: 2017-01-01

## 2017-01-01 RX ORDER — RIVAROXABAN 15 MG/1
TABLET, FILM COATED ORAL
Qty: 42 TABLET | Refills: 0 | OUTPATIENT
Start: 2017-01-01

## 2017-01-01 RX ORDER — PANTOPRAZOLE SODIUM 40 MG/1
TABLET, DELAYED RELEASE ORAL
Qty: 30 TABLET | Refills: 2 | Status: SHIPPED | OUTPATIENT
Start: 2017-01-01 | End: 2017-01-01

## 2017-01-01 RX ORDER — RANITIDINE 25 MG/ML
50 INJECTION, SOLUTION INTRAMUSCULAR; INTRAVENOUS AS NEEDED
Status: CANCELLED | OUTPATIENT
Start: 2017-01-01

## 2017-01-01 RX ORDER — DIPHENHYDRAMINE HYDROCHLORIDE 50 MG/ML
25 INJECTION INTRAMUSCULAR; INTRAVENOUS ONCE
Status: COMPLETED | OUTPATIENT
Start: 2017-01-01 | End: 2017-01-01

## 2017-01-01 RX ORDER — AMLODIPINE BESYLATE 5 MG/1
TABLET ORAL
Qty: 60 TABLET | Refills: 2 | Status: SHIPPED | OUTPATIENT
Start: 2017-01-01 | End: 2018-01-01

## 2017-01-01 RX ORDER — HYDROCODONE BITARTRATE AND ACETAMINOPHEN 7.5; 325 MG/1; MG/1
1 TABLET ORAL EVERY 8 HOURS PRN
Qty: 20 TABLET | Refills: 0 | Status: SHIPPED | OUTPATIENT
Start: 2017-01-01 | End: 2017-01-01

## 2017-01-01 RX ORDER — HYDROCODONE BITARTRATE AND ACETAMINOPHEN 7.5; 325 MG/1; MG/1
TABLET ORAL
Qty: 240 TABLET | Refills: 0 | Status: SHIPPED | OUTPATIENT
Start: 2017-01-01 | End: 2018-01-01

## 2017-01-01 RX ORDER — PANTOPRAZOLE SODIUM 40 MG/1
40 TABLET, DELAYED RELEASE ORAL DAILY
Qty: 30 TABLET | Refills: 3 | Status: SHIPPED | OUTPATIENT
Start: 2017-01-01 | End: 2017-01-01

## 2017-01-01 RX ORDER — ONDANSETRON HYDROCHLORIDE 8 MG/1
8 TABLET, FILM COATED ORAL EVERY 8 HOURS PRN
Qty: 30 TABLET | Refills: 3 | Status: SHIPPED | OUTPATIENT
Start: 2017-01-01 | End: 2017-01-01

## 2017-01-01 RX ORDER — MEPERIDINE HYDROCHLORIDE 25 MG/ML
INJECTION INTRAMUSCULAR; INTRAVENOUS; SUBCUTANEOUS AS NEEDED
Status: CANCELLED | OUTPATIENT
Start: 2017-01-01

## 2017-01-01 RX ORDER — RIVAROXABAN 20 MG/1
TABLET, FILM COATED ORAL
Qty: 30 TABLET | Refills: 0 | Status: SHIPPED | OUTPATIENT
Start: 2017-01-01 | End: 2017-01-01

## 2017-01-01 RX ORDER — DIPHENHYDRAMINE HYDROCHLORIDE 50 MG/ML
INJECTION INTRAMUSCULAR; INTRAVENOUS EVERY 4 HOURS PRN
Status: CANCELLED | OUTPATIENT
Start: 2017-01-01

## 2017-01-01 RX ORDER — PANTOPRAZOLE SODIUM 40 MG/1
TABLET, DELAYED RELEASE ORAL
Qty: 30 TABLET | Refills: 1 | Status: SHIPPED | OUTPATIENT
Start: 2017-01-01 | End: 2018-01-01

## 2017-01-01 RX ORDER — PROCHLORPERAZINE MALEATE 10 MG
10 TABLET ORAL EVERY 6 HOURS PRN
Qty: 30 TABLET | Refills: 3 | Status: SHIPPED | OUTPATIENT
Start: 2017-01-01 | End: 2017-01-01

## 2017-01-01 RX ADMIN — DIPHENHYDRAMINE HYDROCHLORIDE 25 MG: 50 INJECTION INTRAMUSCULAR; INTRAVENOUS at 09:31:00

## 2017-01-01 RX ADMIN — FLUOROURACIL 3750 MG: 50 INJECTION, SOLUTION INTRAVENOUS at 11:48:00

## 2017-01-01 RX ADMIN — FLUOROURACIL 3750 MG: 50 INJECTION, SOLUTION INTRAVENOUS at 12:38:00

## 2017-01-01 RX ADMIN — SODIUM CHLORIDE 0.9 % (FLUSH) 10 ML: 0.9 % SYRINGE (ML) INJECTION at 11:05:00

## 2017-01-01 RX ADMIN — FLUOROURACIL 600 MG: 50 INJECTION, SOLUTION INTRAVENOUS at 11:40:00

## 2017-01-01 RX ADMIN — FLUOROURACIL 3750 MG: 50 INJECTION, SOLUTION INTRAVENOUS at 12:40:00

## 2017-01-01 RX ADMIN — FLUOROURACIL 3750 MG: 50 INJECTION, SOLUTION INTRAVENOUS at 12:07:00

## 2017-01-01 RX ADMIN — FLUOROURACIL 600 MG: 50 INJECTION, SOLUTION INTRAVENOUS at 12:10:00

## 2017-01-01 RX ADMIN — FLUOROURACIL 600 MG: 50 INJECTION, SOLUTION INTRAVENOUS at 13:40:00

## 2017-01-01 RX ADMIN — DIPHENHYDRAMINE HYDROCHLORIDE 25 MG: 50 INJECTION INTRAMUSCULAR; INTRAVENOUS at 09:20:00

## 2017-01-01 RX ADMIN — SODIUM CHLORIDE 0.9 % (FLUSH) 10 ML: 0.9 % SYRINGE (ML) INJECTION at 09:58:00

## 2017-01-01 RX ADMIN — SODIUM CHLORIDE 0.9 % (FLUSH) 10 ML: 0.9 % SYRINGE (ML) INJECTION at 11:50:00

## 2017-01-01 RX ADMIN — FLUOROURACIL 600 MG: 50 INJECTION, SOLUTION INTRAVENOUS at 12:35:00

## 2017-01-01 RX ADMIN — FLUOROURACIL 600 MG: 50 INJECTION, SOLUTION INTRAVENOUS at 12:43:00

## 2017-01-01 RX ADMIN — SODIUM CHLORIDE 0.9 % (FLUSH) 10 ML: 0.9 % SYRINGE (ML) INJECTION at 10:30:00

## 2017-01-01 RX ADMIN — FLUOROURACIL 600 MG: 50 INJECTION, SOLUTION INTRAVENOUS at 11:30:00

## 2017-01-01 RX ADMIN — FLUOROURACIL 600 MG: 50 INJECTION, SOLUTION INTRAVENOUS at 12:15:00

## 2017-01-01 RX ADMIN — SODIUM CHLORIDE 0.9 % (FLUSH) 10 ML: 0.9 % SYRINGE (ML) INJECTION at 08:54:00

## 2017-01-01 RX ADMIN — FLUOROURACIL 600 MG: 50 INJECTION, SOLUTION INTRAVENOUS at 12:56:00

## 2017-01-01 RX ADMIN — FLUOROURACIL 3750 MG: 50 INJECTION, SOLUTION INTRAVENOUS at 12:27:00

## 2017-01-01 RX ADMIN — FLUOROURACIL 3750 MG: 50 INJECTION, SOLUTION INTRAVENOUS at 16:50:00

## 2017-01-01 RX ADMIN — SODIUM CHLORIDE 0.9 % (FLUSH) 10 ML: 0.9 % SYRINGE (ML) INJECTION at 11:30:00

## 2017-01-01 RX ADMIN — FLUOROURACIL 3750 MG: 50 INJECTION, SOLUTION INTRAVENOUS at 13:02:00

## 2017-01-01 RX ADMIN — FLUOROURACIL 600 MG: 50 INJECTION, SOLUTION INTRAVENOUS at 12:04:00

## 2017-01-01 RX ADMIN — FLUOROURACIL 3750 MG: 50 INJECTION, SOLUTION INTRAVENOUS at 15:37:00

## 2017-01-01 RX ADMIN — FLUOROURACIL 600 MG: 50 INJECTION, SOLUTION INTRAVENOUS at 16:45:00

## 2017-01-01 RX ADMIN — SODIUM CHLORIDE 0.9 % (FLUSH) 10 ML: 0.9 % SYRINGE (ML) INJECTION at 14:48:00

## 2017-01-01 RX ADMIN — SODIUM CHLORIDE 0.9 % (FLUSH) 10 ML: 0.9 % SYRINGE (ML) INJECTION at 13:26:00

## 2017-01-01 RX ADMIN — SODIUM CHLORIDE 0.9 % (FLUSH) 10 ML: 0.9 % SYRINGE (ML) INJECTION at 10:00:00

## 2017-01-01 RX ADMIN — FLUOROURACIL 600 MG: 50 INJECTION, SOLUTION INTRAVENOUS at 15:32:00

## 2017-01-01 RX ADMIN — FLUOROURACIL 3750 MG: 50 INJECTION, SOLUTION INTRAVENOUS at 11:35:00

## 2017-01-01 RX ADMIN — FLUOROURACIL 3750 MG: 50 INJECTION, SOLUTION INTRAVENOUS at 12:49:00

## 2017-01-01 RX ADMIN — FLUOROURACIL 600 MG: 50 INJECTION, SOLUTION INTRAVENOUS at 12:00:00

## 2017-01-01 RX ADMIN — FLUOROURACIL 3750 MG: 50 INJECTION, SOLUTION INTRAVENOUS at 12:15:00

## 2017-01-01 RX ADMIN — SODIUM CHLORIDE 0.9 % (FLUSH) 10 ML: 0.9 % SYRINGE (ML) INJECTION at 09:38:00

## 2017-01-01 RX ADMIN — SODIUM CHLORIDE 0.9 % (FLUSH) 10 ML: 0.9 % SYRINGE (ML) INJECTION at 12:30:00

## 2017-01-01 RX ADMIN — SODIUM CHLORIDE 0.9 % (FLUSH) 10 ML: 0.9 % SYRINGE (ML) INJECTION at 08:30:00

## 2017-01-01 RX ADMIN — FLUOROURACIL 3750 MG: 50 INJECTION, SOLUTION INTRAVENOUS at 13:45:00

## 2017-01-01 RX ADMIN — FLUOROURACIL 600 MG: 50 INJECTION, SOLUTION INTRAVENOUS at 12:30:00

## 2017-01-01 RX ADMIN — FLUOROURACIL 3750 MG: 50 INJECTION, SOLUTION INTRAVENOUS at 12:10:00

## 2017-01-01 RX ADMIN — DIPHENHYDRAMINE HYDROCHLORIDE 25 MG: 50 INJECTION INTRAMUSCULAR; INTRAVENOUS at 10:00:00

## 2017-01-11 ENCOUNTER — OFFICE VISIT (OUTPATIENT)
Dept: HEMATOLOGY/ONCOLOGY | Age: 67
End: 2017-01-11
Attending: SPECIALIST
Payer: MEDICARE

## 2017-01-11 VITALS
DIASTOLIC BLOOD PRESSURE: 102 MMHG | OXYGEN SATURATION: 100 % | HEART RATE: 105 BPM | SYSTOLIC BLOOD PRESSURE: 165 MMHG | TEMPERATURE: 97 F | WEIGHT: 164.31 LBS | RESPIRATION RATE: 16 BRPM | BODY MASS INDEX: 24 KG/M2

## 2017-01-11 DIAGNOSIS — C18.9 COLON CANCER METASTASIZED TO LIVER (HCC): Primary | ICD-10-CM

## 2017-01-11 DIAGNOSIS — C19 CANCER OF RECTOSIGMOID (COLON) (HCC): Primary | ICD-10-CM

## 2017-01-11 DIAGNOSIS — C18.9 MALIGNANT NEOPLASM OF COLON, UNSPECIFIED PART OF COLON (HCC): ICD-10-CM

## 2017-01-11 DIAGNOSIS — C78.7 COLON CANCER METASTASIZED TO LIVER (HCC): Primary | ICD-10-CM

## 2017-01-11 DIAGNOSIS — C78.7 LIVER METASTASES (HCC): ICD-10-CM

## 2017-01-11 DIAGNOSIS — C78.7 COLON CANCER METASTASIZED TO LIVER (HCC): ICD-10-CM

## 2017-01-11 DIAGNOSIS — C18.9 COLON CANCER METASTASIZED TO LIVER (HCC): ICD-10-CM

## 2017-01-11 LAB
ALBUMIN SERPL-MCNC: 3.2 G/DL (ref 3.5–4.8)
ALP LIVER SERPL-CCNC: 189 U/L (ref 45–117)
ALT SERPL-CCNC: 29 U/L (ref 17–63)
AST SERPL-CCNC: 29 U/L (ref 15–41)
BASOPHILS # BLD AUTO: 0.04 X10(3) UL (ref 0–0.1)
BASOPHILS NFR BLD AUTO: 0.5 %
BILIRUB SERPL-MCNC: 0.4 MG/DL (ref 0.1–2)
BUN BLD-MCNC: 10 MG/DL (ref 8–20)
CALCIUM BLD-MCNC: 8.1 MG/DL (ref 8.3–10.3)
CEA: 1082.2 NG/ML (ref 0.5–5)
CHLORIDE: 108 MMOL/L (ref 101–111)
CO2: 27 MMOL/L (ref 22–32)
CREAT BLD-MCNC: 0.6 MG/DL (ref 0.7–1.3)
EOSINOPHIL # BLD AUTO: 0.24 X10(3) UL (ref 0–0.3)
EOSINOPHIL NFR BLD AUTO: 2.9 %
ERYTHROCYTE [DISTWIDTH] IN BLOOD BY AUTOMATED COUNT: 14.3 % (ref 11.5–16)
GLUCOSE BLD-MCNC: 130 MG/DL (ref 70–99)
HAV IGM SER QL: 1.3 MG/DL (ref 1.7–3)
HCT VFR BLD AUTO: 42 % (ref 37–53)
HGB BLD-MCNC: 14 G/DL (ref 13–17)
IMMATURE GRANULOCYTE COUNT: 0.02 X10(3) UL (ref 0–1)
IMMATURE GRANULOCYTE RATIO %: 0.2 %
LYMPHOCYTES # BLD AUTO: 1.92 X10(3) UL (ref 0.9–4)
LYMPHOCYTES NFR BLD AUTO: 23.4 %
M PROTEIN MFR SERPL ELPH: 6.9 G/DL (ref 6.1–8.3)
MCH RBC QN AUTO: 31.9 PG (ref 27–33.2)
MCHC RBC AUTO-ENTMCNC: 33.3 G/DL (ref 31–37)
MCV RBC AUTO: 95.7 FL (ref 80–99)
MONOCYTES # BLD AUTO: 0.75 X10(3) UL (ref 0.1–0.6)
MONOCYTES NFR BLD AUTO: 9.1 %
NEUTROPHIL ABS PRELIM: 5.25 X10 (3) UL (ref 1.3–6.7)
NEUTROPHILS # BLD AUTO: 5.25 X10(3) UL (ref 1.3–6.7)
NEUTROPHILS NFR BLD AUTO: 63.9 %
PLATELET # BLD AUTO: 142 10(3)UL (ref 150–450)
POTASSIUM SERPL-SCNC: 3.8 MMOL/L (ref 3.6–5.1)
RBC # BLD AUTO: 4.39 X10(6)UL (ref 3.8–5.8)
RED CELL DISTRIBUTION WIDTH-SD: 50.8 FL (ref 35.1–46.3)
SODIUM SERPL-SCNC: 142 MMOL/L (ref 136–144)
WBC # BLD AUTO: 8.2 X10(3) UL (ref 4–13)

## 2017-01-11 PROCEDURE — 99213 OFFICE O/P EST LOW 20 MIN: CPT | Performed by: INTERNAL MEDICINE

## 2017-01-11 PROCEDURE — 80053 COMPREHEN METABOLIC PANEL: CPT

## 2017-01-11 PROCEDURE — 82378 CARCINOEMBRYONIC ANTIGEN: CPT

## 2017-01-11 PROCEDURE — 85025 COMPLETE CBC W/AUTO DIFF WBC: CPT

## 2017-01-11 PROCEDURE — 82565 ASSAY OF CREATININE: CPT

## 2017-01-11 PROCEDURE — 83735 ASSAY OF MAGNESIUM: CPT

## 2017-01-11 PROCEDURE — 82310 ASSAY OF CALCIUM: CPT

## 2017-01-11 PROCEDURE — 96413 CHEMO IV INFUSION 1 HR: CPT

## 2017-01-11 RX ORDER — SODIUM CHLORIDE 0.9 % (FLUSH) 0.9 %
10 SYRINGE (ML) INJECTION ONCE
Status: COMPLETED | OUTPATIENT
Start: 2017-01-11 | End: 2017-01-11

## 2017-01-11 RX ORDER — ACETAMINOPHEN 325 MG/1
650 TABLET ORAL ONCE
Status: COMPLETED | OUTPATIENT
Start: 2017-01-11 | End: 2017-01-11

## 2017-01-11 RX ORDER — DIPHENHYDRAMINE HCL 25 MG
25 CAPSULE ORAL ONCE
Status: COMPLETED | OUTPATIENT
Start: 2017-01-11 | End: 2017-01-11

## 2017-01-11 RX ORDER — AMLODIPINE BESYLATE 5 MG/1
5 TABLET ORAL DAILY
Qty: 60 TABLET | Refills: 3 | Status: SHIPPED | OUTPATIENT
Start: 2017-01-11 | End: 2017-01-01

## 2017-01-11 RX ADMIN — ACETAMINOPHEN 650 MG: 325 TABLET ORAL at 09:35:00

## 2017-01-11 RX ADMIN — SODIUM CHLORIDE 0.9 % (FLUSH) 10 ML: 0.9 % SYRINGE (ML) INJECTION at 11:18:00

## 2017-01-11 RX ADMIN — DIPHENHYDRAMINE HCL 25 MG: 25 MG CAPSULE ORAL at 09:35:00

## 2017-01-11 NOTE — PROGRESS NOTES
Pt arrived for MD f/u and chemo infusion, pt states has been having some insomnia but denies any any adverse S/S louie, pt alert and appears in nad currently    Education Record    Learner:  Patient    Disease / Diagnosis:colon CA    [place] / Stepan Pérez

## 2017-01-11 NOTE — PROGRESS NOTES
Cancer Center Progress Note  Patient Name: Tea Potter   YOB: 1950   Medical Record Number: [id number]   Attending Physician: Tera Romero M.D.   [facility]  Co-Medical Director, THE [facility] Lisandra PART REMOVAL COLON W END COLOSTOMY      Comment stoma    OTHER SURGICAL HISTORY  1/2016    Comment left shoulder biopsy       Family History:  Family History   Problem Relation Age of Onset   • Adopted: Yes       Social History:    Social History   Oswaldo Day appetite, No Early Satiety. Genitorurinary No hematuria, dysuria, abnormal bleeding. Integumentary No yellowing. Neurologic No headache, blurred vision, and no areas of focal weakness. Normal gait.    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 He has undergone resection of the necrotic primary and has recovered.   Given the persistence of the neuropathy and cold s with eventual tumor growth    Quality of Life: Reviewed the side effects of chemotherapy    Pain Control: No complaints of pain    National Guidelines: Vineet Bell M.D.   [facility]  Co-Medical Director, Jeremiah Beth

## 2017-01-11 NOTE — PROGRESS NOTES
Education Record    Learner:  Patient    Disease / Diagnosis:    Barriers / Limitations:  None   Comments:    Method:  Discussion   Comments:    General Topics:  Medication, Side effects and symptom management and Plan of care reviewed   Comments:    Corinne Rohrer

## 2017-01-18 ENCOUNTER — HOSPITAL ENCOUNTER (OUTPATIENT)
Dept: CT IMAGING | Facility: HOSPITAL | Age: 67
Discharge: HOME | End: 2017-01-18
Attending: INTERNAL MEDICINE
Payer: MEDICARE

## 2017-01-18 ENCOUNTER — HOSPITAL ENCOUNTER (OUTPATIENT)
Dept: CT IMAGING | Age: 67
End: 2017-01-18
Attending: INTERNAL MEDICINE
Payer: MEDICARE

## 2017-01-18 DIAGNOSIS — C78.7 COLON CANCER METASTASIZED TO LIVER (HCC): ICD-10-CM

## 2017-01-18 DIAGNOSIS — C18.9 COLON CANCER METASTASIZED TO LIVER (HCC): ICD-10-CM

## 2017-01-18 PROCEDURE — 74177 CT ABD & PELVIS W/CONTRAST: CPT

## 2017-01-18 PROCEDURE — 71260 CT THORAX DX C+: CPT

## 2017-01-25 ENCOUNTER — OFFICE VISIT (OUTPATIENT)
Dept: HEMATOLOGY/ONCOLOGY | Age: 67
End: 2017-01-25
Attending: SPECIALIST
Payer: MEDICARE

## 2017-01-25 VITALS
DIASTOLIC BLOOD PRESSURE: 93 MMHG | HEART RATE: 97 BPM | TEMPERATURE: 98 F | SYSTOLIC BLOOD PRESSURE: 155 MMHG | OXYGEN SATURATION: 100 % | BODY MASS INDEX: 24 KG/M2 | WEIGHT: 165.81 LBS | RESPIRATION RATE: 18 BRPM

## 2017-01-25 DIAGNOSIS — I82.5Y2 CHRONIC DEEP VEIN THROMBOSIS (DVT) OF PROXIMAL VEIN OF LEFT LOWER EXTREMITY (HCC): ICD-10-CM

## 2017-01-25 DIAGNOSIS — C18.9 MALIGNANT NEOPLASM OF COLON, UNSPECIFIED PART OF COLON (HCC): ICD-10-CM

## 2017-01-25 DIAGNOSIS — C18.9 COLON CANCER METASTASIZED TO LIVER (HCC): ICD-10-CM

## 2017-01-25 DIAGNOSIS — C19 CANCER OF RECTOSIGMOID (COLON) (HCC): Primary | ICD-10-CM

## 2017-01-25 DIAGNOSIS — C78.7 COLON CANCER METASTASIZED TO LIVER (HCC): ICD-10-CM

## 2017-01-25 DIAGNOSIS — C78.7 LIVER METASTASES (HCC): ICD-10-CM

## 2017-01-25 DIAGNOSIS — L27.0 RASH, DRUG: ICD-10-CM

## 2017-01-25 LAB
ALBUMIN SERPL-MCNC: 3.2 G/DL (ref 3.5–4.8)
ALP LIVER SERPL-CCNC: 189 U/L (ref 45–117)
ALT SERPL-CCNC: 33 U/L (ref 17–63)
AST SERPL-CCNC: 33 U/L (ref 15–41)
BASOPHILS # BLD AUTO: 0.04 X10(3) UL (ref 0–0.1)
BASOPHILS NFR BLD AUTO: 0.4 %
BILIRUB SERPL-MCNC: 0.4 MG/DL (ref 0.1–2)
BUN BLD-MCNC: 13 MG/DL (ref 8–20)
CALCIUM BLD-MCNC: 8.3 MG/DL (ref 8.3–10.3)
CEA: 1234.1 NG/ML (ref 0.5–5)
CHLORIDE: 111 MMOL/L (ref 101–111)
CO2: 24 MMOL/L (ref 22–32)
CREAT BLD-MCNC: 0.6 MG/DL (ref 0.7–1.3)
EOSINOPHIL # BLD AUTO: 0.22 X10(3) UL (ref 0–0.3)
EOSINOPHIL NFR BLD AUTO: 2.5 %
ERYTHROCYTE [DISTWIDTH] IN BLOOD BY AUTOMATED COUNT: 14.4 % (ref 11.5–16)
GLUCOSE BLD-MCNC: 122 MG/DL (ref 70–99)
HAV IGM SER QL: 1.2 MG/DL (ref 1.7–3)
HCT VFR BLD AUTO: 42.4 % (ref 37–53)
HGB BLD-MCNC: 14 G/DL (ref 13–17)
IMMATURE GRANULOCYTE COUNT: 0.02 X10(3) UL (ref 0–1)
IMMATURE GRANULOCYTE RATIO %: 0.2 %
LYMPHOCYTES # BLD AUTO: 1.34 X10(3) UL (ref 0.9–4)
LYMPHOCYTES NFR BLD AUTO: 15 %
M PROTEIN MFR SERPL ELPH: 6.9 G/DL (ref 6.1–8.3)
MCH RBC QN AUTO: 31.1 PG (ref 27–33.2)
MCHC RBC AUTO-ENTMCNC: 33 G/DL (ref 31–37)
MCV RBC AUTO: 94.2 FL (ref 80–99)
MONOCYTES # BLD AUTO: 0.99 X10(3) UL (ref 0.1–0.6)
MONOCYTES NFR BLD AUTO: 11.1 %
NEUTROPHIL ABS PRELIM: 6.32 X10 (3) UL (ref 1.3–6.7)
NEUTROPHILS # BLD AUTO: 6.32 X10(3) UL (ref 1.3–6.7)
NEUTROPHILS NFR BLD AUTO: 70.8 %
PLATELET # BLD AUTO: 138 10(3)UL (ref 150–450)
POTASSIUM SERPL-SCNC: 4 MMOL/L (ref 3.6–5.1)
RBC # BLD AUTO: 4.5 X10(6)UL (ref 3.8–5.8)
RED CELL DISTRIBUTION WIDTH-SD: 50.1 FL (ref 35.1–46.3)
SODIUM SERPL-SCNC: 142 MMOL/L (ref 136–144)
WBC # BLD AUTO: 8.9 X10(3) UL (ref 4–13)

## 2017-01-25 PROCEDURE — 80053 COMPREHEN METABOLIC PANEL: CPT

## 2017-01-25 PROCEDURE — 82565 ASSAY OF CREATININE: CPT

## 2017-01-25 PROCEDURE — 82310 ASSAY OF CALCIUM: CPT

## 2017-01-25 PROCEDURE — 96413 CHEMO IV INFUSION 1 HR: CPT

## 2017-01-25 PROCEDURE — 85025 COMPLETE CBC W/AUTO DIFF WBC: CPT

## 2017-01-25 PROCEDURE — 82378 CARCINOEMBRYONIC ANTIGEN: CPT

## 2017-01-25 PROCEDURE — 83735 ASSAY OF MAGNESIUM: CPT

## 2017-01-25 PROCEDURE — 99213 OFFICE O/P EST LOW 20 MIN: CPT | Performed by: INTERNAL MEDICINE

## 2017-01-25 RX ORDER — ACETAMINOPHEN 325 MG/1
650 TABLET ORAL ONCE
Status: COMPLETED | OUTPATIENT
Start: 2017-01-25 | End: 2017-01-25

## 2017-01-25 RX ORDER — MAGNESIUM OXIDE 400 MG (241.3 MG MAGNESIUM) TABLET
400 TABLET 2 TIMES DAILY
COMMUNITY
End: 2017-04-10

## 2017-01-25 RX ORDER — DIPHENHYDRAMINE HCL 25 MG
25 CAPSULE ORAL ONCE
Status: COMPLETED | OUTPATIENT
Start: 2017-01-25 | End: 2017-01-25

## 2017-01-25 RX ORDER — SODIUM CHLORIDE 0.9 % (FLUSH) 0.9 %
10 SYRINGE (ML) INJECTION ONCE
Status: COMPLETED | OUTPATIENT
Start: 2017-01-25 | End: 2017-01-25

## 2017-01-25 RX ADMIN — DIPHENHYDRAMINE HCL 25 MG: 25 MG CAPSULE ORAL at 09:45:00

## 2017-01-25 RX ADMIN — ACETAMINOPHEN 650 MG: 325 TABLET ORAL at 09:45:00

## 2017-01-25 RX ADMIN — SODIUM CHLORIDE 0.9 % (FLUSH) 10 ML: 0.9 % SYRINGE (ML) INJECTION at 11:12:00

## 2017-01-25 NOTE — PROGRESS NOTES
Cancer Center Progress Note  Patient Name: Benigno Bracket   YOB: 1950   Medical Record Number: [id number]   Attending Physician: Yolie aMrquez M.D.   [facility]  Co-Medical Director, THE [facility]isciplinary Lisandra IMP      PART REMOVAL COLON W END COLOSTOMY      Comment stoma    OTHER SURGICAL HISTORY  1/2016    Comment left shoulder biopsy       Family History:  Family History   Problem Relation Age of Onset   • Adopted: Yes       Social History:    Social History appetite, No Early Satiety. Genitorurinary No hematuria, dysuria, abnormal bleeding. Integumentary No yellowing. Neurologic No headache, blurred vision, and no areas of focal weakness. Normal gait.    Psychiatric No insomnia, depression, virgilio or mood and has recovered. Given the persistence of the neuropathy and cold sensitivity r/t his last cycle of chemo, we D/Cd the oxaliplatin and planned single agent Xeloda 1250mg/m2 BID for 2 weeks on and one week off.  The start of the Xeloda was significantly d

## 2017-01-25 NOTE — PROGRESS NOTES
Education Record    Learner:  Patient    Disease / Diagnosis:    Barriers / Limitations:  None   Comments:    Method:  Discussion   Comments:    General Topics:  Medication, Side effects and symptom management and Plan of care reviewed   Comments:    Yolanda Gomez

## 2017-01-25 NOTE — PROGRESS NOTES
Education Record    Learner:  Patient    Disease / Diagnosis:    Barriers / Limitations:  None   Comments:    Method:  Discussion   Comments:    General Topics:  Medication, Pain, Side effects and symptom management and Plan of care reviewed   Comments:

## 2017-01-31 ENCOUNTER — SNF/IP PROF CHARGE ONLY (OUTPATIENT)
Dept: HEMATOLOGY/ONCOLOGY | Facility: HOSPITAL | Age: 67
End: 2017-01-31

## 2017-01-31 DIAGNOSIS — C18.9 COLON CANCER METASTASIZED TO LIVER (HCC): Primary | ICD-10-CM

## 2017-01-31 DIAGNOSIS — C78.7 COLON CANCER METASTASIZED TO LIVER (HCC): Primary | ICD-10-CM

## 2017-01-31 PROCEDURE — G9678 ONCOLOGY CARE MODEL SERVICE: HCPCS | Performed by: INTERNAL MEDICINE

## 2017-02-08 ENCOUNTER — OFFICE VISIT (OUTPATIENT)
Dept: HEMATOLOGY/ONCOLOGY | Age: 67
End: 2017-02-08
Attending: INTERNAL MEDICINE
Payer: MEDICARE

## 2017-02-08 VITALS
RESPIRATION RATE: 18 BRPM | TEMPERATURE: 97 F | BODY MASS INDEX: 25 KG/M2 | DIASTOLIC BLOOD PRESSURE: 81 MMHG | HEART RATE: 83 BPM | WEIGHT: 167.5 LBS | SYSTOLIC BLOOD PRESSURE: 127 MMHG

## 2017-02-08 DIAGNOSIS — C18.9 COLON CANCER METASTASIZED TO LIVER (HCC): ICD-10-CM

## 2017-02-08 DIAGNOSIS — C18.9 MALIGNANT NEOPLASM OF COLON, UNSPECIFIED PART OF COLON (HCC): ICD-10-CM

## 2017-02-08 DIAGNOSIS — L27.0 RASH, DRUG: ICD-10-CM

## 2017-02-08 DIAGNOSIS — C19 CANCER OF RECTOSIGMOID (COLON) (HCC): Primary | ICD-10-CM

## 2017-02-08 DIAGNOSIS — G89.29 CHRONIC LEFT SHOULDER PAIN: ICD-10-CM

## 2017-02-08 DIAGNOSIS — C78.7 COLON CANCER METASTASIZED TO LIVER (HCC): ICD-10-CM

## 2017-02-08 DIAGNOSIS — C78.7 LIVER METASTASES (HCC): ICD-10-CM

## 2017-02-08 DIAGNOSIS — M25.512 CHRONIC LEFT SHOULDER PAIN: ICD-10-CM

## 2017-02-08 LAB
ALBUMIN SERPL-MCNC: 3.2 G/DL (ref 3.5–4.8)
ALP LIVER SERPL-CCNC: 220 U/L (ref 45–117)
ALT SERPL-CCNC: 44 U/L (ref 17–63)
AST SERPL-CCNC: 38 U/L (ref 15–41)
BASOPHILS # BLD AUTO: 0.06 X10(3) UL (ref 0–0.1)
BASOPHILS NFR BLD AUTO: 0.7 %
BILIRUB SERPL-MCNC: 0.3 MG/DL (ref 0.1–2)
BUN BLD-MCNC: 17 MG/DL (ref 8–20)
CALCIUM BLD-MCNC: 8.6 MG/DL (ref 8.3–10.3)
CEA: 1247.7 NG/ML (ref 0.5–5)
CHLORIDE: 110 MMOL/L (ref 101–111)
CO2: 26 MMOL/L (ref 22–32)
CREAT BLD-MCNC: 0.62 MG/DL (ref 0.7–1.3)
EOSINOPHIL # BLD AUTO: 0.37 X10(3) UL (ref 0–0.3)
EOSINOPHIL NFR BLD AUTO: 4.2 %
ERYTHROCYTE [DISTWIDTH] IN BLOOD BY AUTOMATED COUNT: 14.5 % (ref 11.5–16)
GLUCOSE BLD-MCNC: 99 MG/DL (ref 70–99)
HAV IGM SER QL: 1.3 MG/DL (ref 1.7–3)
HCT VFR BLD AUTO: 42.1 % (ref 37–53)
HGB BLD-MCNC: 14.3 G/DL (ref 13–17)
IMMATURE GRANULOCYTE COUNT: 0.02 X10(3) UL (ref 0–1)
IMMATURE GRANULOCYTE RATIO %: 0.2 %
LYMPHOCYTES # BLD AUTO: 1.96 X10(3) UL (ref 0.9–4)
LYMPHOCYTES NFR BLD AUTO: 22.3 %
M PROTEIN MFR SERPL ELPH: 6.9 G/DL (ref 6.1–8.3)
MCH RBC QN AUTO: 31.7 PG (ref 27–33.2)
MCHC RBC AUTO-ENTMCNC: 34 G/DL (ref 31–37)
MCV RBC AUTO: 93.3 FL (ref 80–99)
MONOCYTES # BLD AUTO: 0.88 X10(3) UL (ref 0.1–0.6)
MONOCYTES NFR BLD AUTO: 10 %
NEUTROPHIL ABS PRELIM: 5.5 X10 (3) UL (ref 1.3–6.7)
NEUTROPHILS # BLD AUTO: 5.5 X10(3) UL (ref 1.3–6.7)
NEUTROPHILS NFR BLD AUTO: 62.6 %
PLATELET # BLD AUTO: 175 10(3)UL (ref 150–450)
POTASSIUM SERPL-SCNC: 4 MMOL/L (ref 3.6–5.1)
RBC # BLD AUTO: 4.51 X10(6)UL (ref 3.8–5.8)
RED CELL DISTRIBUTION WIDTH-SD: 49.5 FL (ref 35.1–46.3)
SODIUM SERPL-SCNC: 144 MMOL/L (ref 136–144)
WBC # BLD AUTO: 8.8 X10(3) UL (ref 4–13)

## 2017-02-08 PROCEDURE — 99214 OFFICE O/P EST MOD 30 MIN: CPT | Performed by: INTERNAL MEDICINE

## 2017-02-08 PROCEDURE — 85025 COMPLETE CBC W/AUTO DIFF WBC: CPT

## 2017-02-08 PROCEDURE — 96413 CHEMO IV INFUSION 1 HR: CPT

## 2017-02-08 PROCEDURE — 83735 ASSAY OF MAGNESIUM: CPT

## 2017-02-08 PROCEDURE — 82378 CARCINOEMBRYONIC ANTIGEN: CPT

## 2017-02-08 PROCEDURE — 82565 ASSAY OF CREATININE: CPT

## 2017-02-08 PROCEDURE — 80053 COMPREHEN METABOLIC PANEL: CPT

## 2017-02-08 RX ORDER — CLINDAMYCIN PHOSPHATE 10 MG/ML
LOTION TOPICAL
Qty: 60 ML | Refills: 1 | Status: SHIPPED | OUTPATIENT
Start: 2017-02-08 | End: 2017-04-10

## 2017-02-08 RX ORDER — DIPHENHYDRAMINE HCL 25 MG
25 CAPSULE ORAL ONCE
Status: COMPLETED | OUTPATIENT
Start: 2017-02-08 | End: 2017-02-08

## 2017-02-08 RX ORDER — ACETAMINOPHEN 325 MG/1
650 TABLET ORAL ONCE
Status: COMPLETED | OUTPATIENT
Start: 2017-02-08 | End: 2017-02-08

## 2017-02-08 RX ADMIN — ACETAMINOPHEN 650 MG: 325 TABLET ORAL at 10:26:00

## 2017-02-08 RX ADMIN — DIPHENHYDRAMINE HCL 25 MG: 25 MG CAPSULE ORAL at 10:26:00

## 2017-02-08 NOTE — PROGRESS NOTES
Education Record    Learner:  Patient    Disease / Diagnosis:    Barriers / Limitations:  None   Comments:    Method:  Discussion   Comments:    General Topics:  Medication, Side effects and symptom management and Plan of care reviewed   Comments:    Sammie Adams

## 2017-02-08 NOTE — PROGRESS NOTES
Education Record    Learner:  Patient    Disease / Diagnosis:colon cancer    Barriers / Limitations:  None    Method:  Brief focused, printed material and  reinforcement    General Topics:  Plan of care reviewed    Outcome:  Shows understanding

## 2017-02-08 NOTE — PROGRESS NOTES
Cancer Center Progress Note  Patient Name: Shawn Bolanos   YOB: 1950   Medical Record Number: [id number]   Attending Physician: Rubi Che M.D.   THE [facility]  Co-Medical Director, THE [facility]andra History:  Past Medical History   Diagnosis Date   • Colon cancer metastasized to liver [facility])    • [place]-vesical fistula    • Unspecified essential hypertension        Past Surgical History:      Past Surgical History    OTHER      Comment teeth extraction Allergies     Review of Systems:     Constitutional No fevers, chills, night sweats, excessive fatigue or weight loss. Eyes No significant visual difficulties. No diplopia. No yellowing. Hematologic/Lymphatic Normal - No easy bruising or bleeding.   No Coherent speech. Verbalizes understanding of our discussions today.          Recent Labs   02/08/17 0924   RBC 4.51   HGB 14.3   HCT 42.1   MCV 93.3   MCH 31.7   MCHC 34.0   RDW 14.5   NEPRELIM 5.50   WBC 8.8   .0       Recent Labs   02/08/17 0924 progression He has started Vectibix and tolerated cycles 1 - 7 with the expected rash, but no other side effects. CT demonstrates response to therapy. Proceed with cycle 8 today. Trial of topical cleocin for rash.     L shoulder pain and decreased ROM: Sec

## 2017-02-08 NOTE — PATIENT INSTRUCTIONS
To reach Dr Ivelisse peterson during business hours, please call [phone]. After hours, including weekends, evenings, and holidays, please call the main number [phone] for emergent needs.

## 2017-02-14 ENCOUNTER — OFFICE VISIT (OUTPATIENT)
Dept: PHYSICAL THERAPY | Age: 67
End: 2017-02-14
Attending: FAMILY MEDICINE
Payer: MEDICARE

## 2017-02-14 DIAGNOSIS — M25.512 LEFT SHOULDER PAIN: Primary | ICD-10-CM

## 2017-02-14 DIAGNOSIS — G89.29 OTHER CHRONIC PAIN: ICD-10-CM

## 2017-02-14 PROCEDURE — 97163 PT EVAL HIGH COMPLEX 45 MIN: CPT

## 2017-02-14 PROCEDURE — 97110 THERAPEUTIC EXERCISES: CPT

## 2017-02-14 NOTE — PROGRESS NOTES
UPPER EXTREMITY EVALUATION:   Referring Physician: Dr. Strauss ref.  provider found  Diagnosis: Left shoulder pain (M25.512)  Other chronic pain (G89.29)     Date of Service: 2/14/2017     PATIENT SUMMARY   Corina Beltran is a 73year old y/o male who presen destructive mass at scapula. No imaging reports on [address]. Discuss with patient that we will address palliative ROM exercises. Further medical testing needed perform further stretching, manual therapy and  ROM.      High Complexity: RTC syndrome and possible te functional ROM. Frequency / Duration: Patient will be seen for 2 x/week or a total of 5 visits over a 90 day period. Treatment will include: Manual Therapy for soft tissue;  Therapeutic Exercises with precaution of bone METs in scapula and no imaging r

## 2017-02-21 ENCOUNTER — TELEPHONE (OUTPATIENT)
Dept: HEMATOLOGY/ONCOLOGY | Facility: HOSPITAL | Age: 67
End: 2017-02-21

## 2017-02-21 ENCOUNTER — OFFICE VISIT (OUTPATIENT)
Dept: PHYSICAL THERAPY | Age: 67
End: 2017-02-21
Attending: FAMILY MEDICINE
Payer: MEDICARE

## 2017-02-21 PROCEDURE — 97110 THERAPEUTIC EXERCISES: CPT

## 2017-02-21 NOTE — PROGRESS NOTES
Dx: Diagnosis: Left shoulder pain (M25.512)  Other chronic pain (G89.29)           Authorized # of Visits:  4         Next MD visit: none scheduled  Fall Risk: standard         Precautions: Cancer, METS scapula, no imaging on [address] [facility] joint          Spoke to  HO.Patient demonstrate correct technique         -         -         -         -         Skilled Services: To provide HEP to reduce pain, improve posture and increase ROM below 90 until further medical consult.     Charges:  Ex 3       Total Timed Treatment colon-vesical fistula, L scapula destructive mass, neuropathy and cold sensitivity, necrotic mass bladder, hand foot syndrome.     ASSESSMENT  Mr. Fish Putnam demonstrates findings of limited ER with bony end feel and possible RC tear with difficulty initiate an with safety precaution of bone integrity. Patient will be able to perform shoulder flexion to 120 degrees with AAROM to assist with reducing pain and keeping functional ROM.       Frequency / Duration: Patient will be seen for 2 x/week or a total of 5 vis

## 2017-02-22 ENCOUNTER — OFFICE VISIT (OUTPATIENT)
Dept: HEMATOLOGY/ONCOLOGY | Age: 67
End: 2017-02-22
Attending: INTERNAL MEDICINE
Payer: MEDICARE

## 2017-02-22 ENCOUNTER — TELEPHONE (OUTPATIENT)
Dept: HEMATOLOGY/ONCOLOGY | Facility: HOSPITAL | Age: 67
End: 2017-02-22

## 2017-02-22 VITALS
DIASTOLIC BLOOD PRESSURE: 81 MMHG | WEIGHT: 166.5 LBS | RESPIRATION RATE: 18 BRPM | SYSTOLIC BLOOD PRESSURE: 128 MMHG | TEMPERATURE: 98 F | HEART RATE: 89 BPM | BODY MASS INDEX: 25 KG/M2

## 2017-02-22 DIAGNOSIS — C18.9 MALIGNANT NEOPLASM OF COLON, UNSPECIFIED PART OF COLON (HCC): ICD-10-CM

## 2017-02-22 DIAGNOSIS — C78.7 COLON CANCER METASTASIZED TO LIVER (HCC): ICD-10-CM

## 2017-02-22 DIAGNOSIS — C78.7 LIVER METASTASES (HCC): ICD-10-CM

## 2017-02-22 DIAGNOSIS — C19 CANCER OF RECTOSIGMOID (COLON) (HCC): Primary | ICD-10-CM

## 2017-02-22 DIAGNOSIS — C18.9 COLON CANCER METASTASIZED TO LIVER (HCC): ICD-10-CM

## 2017-02-22 LAB
ALBUMIN SERPL-MCNC: 3.4 G/DL (ref 3.5–4.8)
ALP LIVER SERPL-CCNC: 194 U/L (ref 45–117)
ALT SERPL-CCNC: 27 U/L (ref 17–63)
AST SERPL-CCNC: 33 U/L (ref 15–41)
BASOPHILS # BLD AUTO: 0.05 X10(3) UL (ref 0–0.1)
BASOPHILS NFR BLD AUTO: 0.5 %
BILIRUB SERPL-MCNC: 0.3 MG/DL (ref 0.1–2)
BUN BLD-MCNC: 17 MG/DL (ref 8–20)
CALCIUM BLD-MCNC: 8.6 MG/DL (ref 8.3–10.3)
CEA: 1592.5 NG/ML (ref 0.5–5)
CHLORIDE: 109 MMOL/L (ref 101–111)
CO2: 25 MMOL/L (ref 22–32)
CREAT BLD-MCNC: 0.69 MG/DL (ref 0.7–1.3)
EOSINOPHIL # BLD AUTO: 0.4 X10(3) UL (ref 0–0.3)
EOSINOPHIL NFR BLD AUTO: 4 %
ERYTHROCYTE [DISTWIDTH] IN BLOOD BY AUTOMATED COUNT: 14.6 % (ref 11.5–16)
GLUCOSE BLD-MCNC: 85 MG/DL (ref 70–99)
HAV IGM SER QL: 1.2 MG/DL (ref 1.7–3)
HCT VFR BLD AUTO: 41 % (ref 37–53)
HGB BLD-MCNC: 14 G/DL (ref 13–17)
IMMATURE GRANULOCYTE COUNT: 0.02 X10(3) UL (ref 0–1)
IMMATURE GRANULOCYTE RATIO %: 0.2 %
LYMPHOCYTES # BLD AUTO: 1.89 X10(3) UL (ref 0.9–4)
LYMPHOCYTES NFR BLD AUTO: 19.1 %
M PROTEIN MFR SERPL ELPH: 7 G/DL (ref 6.1–8.3)
MCH RBC QN AUTO: 31.8 PG (ref 27–33.2)
MCHC RBC AUTO-ENTMCNC: 34.1 G/DL (ref 31–37)
MCV RBC AUTO: 93.2 FL (ref 80–99)
MONOCYTES # BLD AUTO: 0.93 X10(3) UL (ref 0.1–0.6)
MONOCYTES NFR BLD AUTO: 9.4 %
NEUTROPHIL ABS PRELIM: 6.61 X10 (3) UL (ref 1.3–6.7)
NEUTROPHILS # BLD AUTO: 6.61 X10(3) UL (ref 1.3–6.7)
NEUTROPHILS NFR BLD AUTO: 66.8 %
PLATELET # BLD AUTO: 155 10(3)UL (ref 150–450)
POTASSIUM SERPL-SCNC: 4.3 MMOL/L (ref 3.6–5.1)
RBC # BLD AUTO: 4.4 X10(6)UL (ref 3.8–5.8)
RED CELL DISTRIBUTION WIDTH-SD: 50.3 FL (ref 35.1–46.3)
SODIUM SERPL-SCNC: 142 MMOL/L (ref 136–144)
WBC # BLD AUTO: 9.9 X10(3) UL (ref 4–13)

## 2017-02-22 PROCEDURE — 85025 COMPLETE CBC W/AUTO DIFF WBC: CPT

## 2017-02-22 PROCEDURE — 82378 CARCINOEMBRYONIC ANTIGEN: CPT

## 2017-02-22 PROCEDURE — 96413 CHEMO IV INFUSION 1 HR: CPT

## 2017-02-22 PROCEDURE — 83735 ASSAY OF MAGNESIUM: CPT

## 2017-02-22 PROCEDURE — 80053 COMPREHEN METABOLIC PANEL: CPT

## 2017-02-22 RX ORDER — ACETAMINOPHEN 325 MG/1
650 TABLET ORAL ONCE
Status: COMPLETED | OUTPATIENT
Start: 2017-02-22 | End: 2017-02-22

## 2017-02-22 RX ORDER — DIPHENHYDRAMINE HCL 25 MG
25 CAPSULE ORAL ONCE
Status: COMPLETED | OUTPATIENT
Start: 2017-02-22 | End: 2017-02-22

## 2017-02-22 RX ORDER — SODIUM CHLORIDE 0.9 % (FLUSH) 0.9 %
10 SYRINGE (ML) INJECTION ONCE
Status: COMPLETED | OUTPATIENT
Start: 2017-02-22 | End: 2017-02-22

## 2017-02-22 RX ADMIN — SODIUM CHLORIDE 0.9 % (FLUSH) 10 ML: 0.9 % SYRINGE (ML) INJECTION at 09:30:00

## 2017-02-22 RX ADMIN — ACETAMINOPHEN 650 MG: 325 TABLET ORAL at 09:41:00

## 2017-02-22 RX ADMIN — DIPHENHYDRAMINE HCL 25 MG: 25 MG CAPSULE ORAL at 09:41:00

## 2017-02-22 NOTE — TELEPHONE ENCOUNTER
Per MD notified patient magnesium level is low. Patient states he is taking Oral Mag 400 mg daily. Instructed to increase to 400 mg bid; verbalized understanding.

## 2017-02-28 ENCOUNTER — OFFICE VISIT (OUTPATIENT)
Dept: PHYSICAL THERAPY | Age: 67
End: 2017-02-28
Attending: FAMILY MEDICINE
Payer: MEDICARE

## 2017-02-28 ENCOUNTER — SNF/IP PROF CHARGE ONLY (OUTPATIENT)
Dept: HEMATOLOGY/ONCOLOGY | Facility: HOSPITAL | Age: 67
End: 2017-02-28

## 2017-02-28 DIAGNOSIS — C18.9 COLON CANCER METASTASIZED TO LIVER (HCC): Primary | ICD-10-CM

## 2017-02-28 DIAGNOSIS — C78.7 COLON CANCER METASTASIZED TO LIVER (HCC): Primary | ICD-10-CM

## 2017-02-28 PROCEDURE — G9678 ONCOLOGY CARE MODEL SERVICE: HCPCS | Performed by: INTERNAL MEDICINE

## 2017-02-28 PROCEDURE — 97110 THERAPEUTIC EXERCISES: CPT

## 2017-02-28 NOTE — PROGRESS NOTES
Dx: Diagnosis: Left shoulder pain (M25.512)  Other chronic pain (G89.29)           Authorized # of Visits:  4         Next MD visit: none scheduled  Fall Risk: standard         Precautions: Cancer, METS scapula, no imaging on GH joint          Subjective: further medical consult. Charges:  Ex 3       Total Timed Treatment: 45 min  Total Treatment Time: 45 min     UPPER EXTREMITY EVALUATION:   Referring Physician: Dr. Strauss ref.  provider found  Diagnosis: Left shoulder pain (M25.512)  Other chronic pain (G89 bony end feel and possible RC tear with difficulty initiate and lowering shoulder flex and abd. CT scan show destructive mass at scapula. No imaging reports on [address]. Discuss with patient that we will address palliative ROM exercises.  Further medical testing Duration: Patient will be seen for 2 x/week or a total of 5 visits over a 90 day period. Treatment will include: Manual Therapy for soft tissue; Therapeutic Exercises with precaution of bone METs in scapula and no imaging reports at this time;  Neuromuscula

## 2017-03-02 ENCOUNTER — OFFICE VISIT (OUTPATIENT)
Dept: PHYSICAL THERAPY | Age: 67
End: 2017-03-02
Attending: FAMILY MEDICINE
Payer: MEDICARE

## 2017-03-02 PROCEDURE — 97110 THERAPEUTIC EXERCISES: CPT

## 2017-03-02 NOTE — PROGRESS NOTES
Dx: Diagnosis: Left shoulder pain (M25.512)  Other chronic pain (G89.29)           Authorized # of Visits:  4         Next MD visit: none scheduled  Fall Risk: standard         Precautions: Cancer, METS scapula, no imaging on GH joint          Subjective: - ROW with red 20 x  -       - -        - -        Skilled Services: To provide HEP to reduce pain, improve posture and increase ROM below 90 until further medical consult.     Charges:  Ex 3       Total Timed Treatment: 45 min  Total Treatment Time: 4 destructive mass, neuropathy and cold sensitivity, necrotic mass bladder, hand foot syndrome. ASSESSMENT  Mr. Shoaib Díaz demonstrates findings of limited ER with bony end feel and possible RC tear with difficulty initiate and lowering shoulder flex and abd. integrity. Patient will be able to perform shoulder flexion to 120 degrees with AAROM to assist with reducing pain and keeping functional ROM. Frequency / Duration: Patient will be seen for 2 x/week or a total of 5 visits over a 90 day period.  Treat

## 2017-03-07 ENCOUNTER — OFFICE VISIT (OUTPATIENT)
Dept: PHYSICAL THERAPY | Age: 67
End: 2017-03-07
Attending: FAMILY MEDICINE
Payer: MEDICARE

## 2017-03-07 PROCEDURE — 97110 THERAPEUTIC EXERCISES: CPT

## 2017-03-07 NOTE — PROGRESS NOTES
Dx: Diagnosis: Left shoulder pain (M25.512)  Other chronic pain (G89.29)           Authorized # of Visits:  4         Next MD visit: none scheduled  Fall Risk: standard         Precautions: Cancer, METS scapula, no imaging on GH joint          Subjective: Pain-free PROM  Flexion, abd, ER, IR x 15 x x x      GI-II GH inferior glide between and with PROM. x x x      Instruct HEP. Issue HO. Patient demonstrate correct technique Forward reach supine 1 lb 2 x 15 x HOme      - Sidelying   Shoulder ER 2 x 10 X 1 describes prior level of function patient has had soreness in shoulder blade with ROM but was not limited in ROM. Pt goals include learning what is causing shoulder pain and what steps he can take to help shoulder. .  Past medical history was reviewed with treatment plan and goals. Patient was instructed in and issued a HEP for: pendulum.  Patient demonstrate correct technique  Charges: PT Eval High Complexity, Ex      Total Timed Treatment: 43 min     Total Treatment Time: 43 min     PLAN OF CARE:    Goals:

## 2017-03-08 ENCOUNTER — OFFICE VISIT (OUTPATIENT)
Dept: HEMATOLOGY/ONCOLOGY | Age: 67
End: 2017-03-08
Attending: INTERNAL MEDICINE
Payer: MEDICARE

## 2017-03-08 VITALS
BODY MASS INDEX: 25 KG/M2 | SYSTOLIC BLOOD PRESSURE: 122 MMHG | TEMPERATURE: 98 F | WEIGHT: 167.69 LBS | RESPIRATION RATE: 18 BRPM | DIASTOLIC BLOOD PRESSURE: 75 MMHG | HEART RATE: 88 BPM | OXYGEN SATURATION: 98 %

## 2017-03-08 DIAGNOSIS — E83.42 HYPOMAGNESEMIA: ICD-10-CM

## 2017-03-08 DIAGNOSIS — C78.7 COLON CANCER METASTASIZED TO LIVER (HCC): ICD-10-CM

## 2017-03-08 DIAGNOSIS — C18.9 COLON CANCER METASTASIZED TO LIVER (HCC): ICD-10-CM

## 2017-03-08 DIAGNOSIS — C18.9 MALIGNANT NEOPLASM OF COLON, UNSPECIFIED PART OF COLON (HCC): ICD-10-CM

## 2017-03-08 DIAGNOSIS — I82.5Y2 CHRONIC DEEP VEIN THROMBOSIS (DVT) OF PROXIMAL VEIN OF LEFT LOWER EXTREMITY (HCC): ICD-10-CM

## 2017-03-08 DIAGNOSIS — C78.7 LIVER METASTASES (HCC): ICD-10-CM

## 2017-03-08 DIAGNOSIS — C78.7 COLON CANCER METASTASIZED TO LIVER (HCC): Primary | ICD-10-CM

## 2017-03-08 DIAGNOSIS — C19 CANCER OF RECTOSIGMOID (COLON) (HCC): ICD-10-CM

## 2017-03-08 DIAGNOSIS — C19 CANCER OF RECTOSIGMOID (COLON) (HCC): Primary | ICD-10-CM

## 2017-03-08 DIAGNOSIS — C18.9 COLON CANCER METASTASIZED TO LIVER (HCC): Primary | ICD-10-CM

## 2017-03-08 LAB
ALBUMIN SERPL-MCNC: 3.3 G/DL (ref 3.5–4.8)
ALP LIVER SERPL-CCNC: 213 U/L (ref 45–117)
ALT SERPL-CCNC: 31 U/L (ref 17–63)
AST SERPL-CCNC: 34 U/L (ref 15–41)
BASOPHILS # BLD AUTO: 0.05 X10(3) UL (ref 0–0.1)
BASOPHILS NFR BLD AUTO: 0.5 %
BILIRUB SERPL-MCNC: 0.4 MG/DL (ref 0.1–2)
BUN BLD-MCNC: 12 MG/DL (ref 8–20)
CALCIUM BLD-MCNC: 8.6 MG/DL (ref 8.3–10.3)
CEA: 2039.6 NG/ML (ref 0.5–5)
CHLORIDE: 109 MMOL/L (ref 101–111)
CO2: 26 MMOL/L (ref 22–32)
CREAT BLD-MCNC: 0.55 MG/DL (ref 0.7–1.3)
EOSINOPHIL # BLD AUTO: 0.33 X10(3) UL (ref 0–0.3)
EOSINOPHIL NFR BLD AUTO: 3.5 %
ERYTHROCYTE [DISTWIDTH] IN BLOOD BY AUTOMATED COUNT: 14.6 % (ref 11.5–16)
GLUCOSE BLD-MCNC: 79 MG/DL (ref 70–99)
HAV IGM SER QL: 0.8 MG/DL (ref 1.7–3)
HCT VFR BLD AUTO: 40.1 % (ref 37–53)
HGB BLD-MCNC: 13.3 G/DL (ref 13–17)
IMMATURE GRANULOCYTE COUNT: 0.03 X10(3) UL (ref 0–1)
IMMATURE GRANULOCYTE RATIO %: 0.3 %
LYMPHOCYTES # BLD AUTO: 2.08 X10(3) UL (ref 0.9–4)
LYMPHOCYTES NFR BLD AUTO: 22.2 %
M PROTEIN MFR SERPL ELPH: 7.2 G/DL (ref 6.1–8.3)
MCH RBC QN AUTO: 31.3 PG (ref 27–33.2)
MCHC RBC AUTO-ENTMCNC: 33.2 G/DL (ref 31–37)
MCV RBC AUTO: 94.4 FL (ref 80–99)
MONOCYTES # BLD AUTO: 1.13 X10(3) UL (ref 0.1–0.6)
MONOCYTES NFR BLD AUTO: 12 %
NEUTROPHIL ABS PRELIM: 5.77 X10 (3) UL (ref 1.3–6.7)
NEUTROPHILS # BLD AUTO: 5.77 X10(3) UL (ref 1.3–6.7)
NEUTROPHILS NFR BLD AUTO: 61.5 %
PLATELET # BLD AUTO: 181 10(3)UL (ref 150–450)
POTASSIUM SERPL-SCNC: 4 MMOL/L (ref 3.6–5.1)
RBC # BLD AUTO: 4.25 X10(6)UL (ref 3.8–5.8)
RED CELL DISTRIBUTION WIDTH-SD: 50.7 FL (ref 35.1–46.3)
SODIUM SERPL-SCNC: 142 MMOL/L (ref 136–144)
WBC # BLD AUTO: 9.4 X10(3) UL (ref 4–13)

## 2017-03-08 PROCEDURE — 85025 COMPLETE CBC W/AUTO DIFF WBC: CPT

## 2017-03-08 PROCEDURE — 82378 CARCINOEMBRYONIC ANTIGEN: CPT

## 2017-03-08 PROCEDURE — 83735 ASSAY OF MAGNESIUM: CPT

## 2017-03-08 PROCEDURE — 96367 TX/PROPH/DG ADDL SEQ IV INF: CPT

## 2017-03-08 PROCEDURE — 80053 COMPREHEN METABOLIC PANEL: CPT

## 2017-03-08 PROCEDURE — 96413 CHEMO IV INFUSION 1 HR: CPT

## 2017-03-08 PROCEDURE — 99214 OFFICE O/P EST MOD 30 MIN: CPT | Performed by: INTERNAL MEDICINE

## 2017-03-08 RX ORDER — SODIUM CHLORIDE 0.9 % (FLUSH) 0.9 %
10 SYRINGE (ML) INJECTION ONCE
Status: COMPLETED | OUTPATIENT
Start: 2017-03-08 | End: 2017-03-08

## 2017-03-08 RX ORDER — ACETAMINOPHEN 325 MG/1
650 TABLET ORAL ONCE
Status: COMPLETED | OUTPATIENT
Start: 2017-03-08 | End: 2017-03-08

## 2017-03-08 RX ORDER — DIPHENHYDRAMINE HCL 25 MG
25 CAPSULE ORAL ONCE
Status: COMPLETED | OUTPATIENT
Start: 2017-03-08 | End: 2017-03-08

## 2017-03-08 RX ADMIN — DIPHENHYDRAMINE HCL 25 MG: 25 MG CAPSULE ORAL at 10:30:00

## 2017-03-08 RX ADMIN — SODIUM CHLORIDE 0.9 % (FLUSH) 10 ML: 0.9 % SYRINGE (ML) INJECTION at 12:52:00

## 2017-03-08 RX ADMIN — ACETAMINOPHEN 650 MG: 325 TABLET ORAL at 10:30:00

## 2017-03-08 NOTE — PROGRESS NOTES
Cancer Center Progress Note  Patient Name: Jethro Marie   YOB: 1950   Medical Record Number: [id number]   Attending Physician: [facility]  Co-Medical Director, Sudhir Murcia Lisandra metastasized to liver [facility])    • [place]-vesical fistula    • Unspecified essential hypertension        Past Surgical History:      Past Surgical History    OTHER      Comment teeth extraction    COLONOSCOPY      NEEDLE BIOPSY LIVER      PORT, INDWELLING, IMP chills, night sweats, excessive fatigue or weight loss. Eyes No significant visual difficulties. No diplopia. No yellowing of the eyes. Hematologic/Lymphatic No easy bruising or bleeding. No any tender or palpable lymph nodes.    Respiratory No dyspnea Coherent speech. Verbalizes understanding of our discussions today.          Recent Labs   03/08/17 0925   RBC 4.25   HGB 13.3   HCT 40.1   MCV 94.4   MCH 31.3   MCHC 33.2   RDW 14.6   NEPRELIM 5.77   WBC 9.4   .0       Recent Labs   03/08/17 0925 progression He has started Vectibix and tolerated cycles 1 - 8 with the expected rash, but no other side effects. Proceed with cycle 9 today. Rash is much improved with topical cleocin.     L shoulder pain and decreased ROM: Secondary to calcification relat

## 2017-03-08 NOTE — PROGRESS NOTES
Education Record    Learner:  Patient    Disease / Diagnosis:    Barriers / Limitations:  None   Comments:    Method:  Discussion   Comments:    General Topics:  Medication, Side effects and symptom management and Plan of care reviewed   Comments:    Maria Luisa Simon

## 2017-03-08 NOTE — PATIENT INSTRUCTIONS
To reach Dr Garrison Singleton nurse during business hours, please call [phone]. After hours, including weekends, evenings, and holidays, please call the main number [phone] for emergent needs.

## 2017-03-08 NOTE — PROGRESS NOTES
Education Record    Learner:  Patient    Disease / Diagnosis:colon ca    Barriers / Limitations:  None   Comments:    Method:  Reinforcement   Comments:    General Topics:  Side effects and symptom management and Plan of care reviewed   Comments:    Outcom

## 2017-03-09 ENCOUNTER — OFFICE VISIT (OUTPATIENT)
Dept: PHYSICAL THERAPY | Age: 67
End: 2017-03-09
Attending: FAMILY MEDICINE
Payer: MEDICARE

## 2017-03-09 PROCEDURE — 97110 THERAPEUTIC EXERCISES: CPT

## 2017-03-09 NOTE — PROGRESS NOTES
Dx: Diagnosis: Left shoulder pain (M25.512)  Other chronic pain (G89.29)           Authorized # of Visits:  4         Next MD visit: none scheduled  Fall Risk: standard         Precautions: Cancer, METS scapula, no imaging on GH joint          Subjective: Tx#: 3/ Date: 03/02/2017  Tx#: 4/ Date: 03/07/2017  Tx#: 5/   Date:   03/09/2017  Tx#: 6/ Date: Tx#: 7/ Date:    Tx#: 8/   Shoulder circles x15 Manual Gentle Shoulder blade circles, retractions x   Sci fit 2 F/ 2 B   Sci fit  2 F/2 B     Forward reach sup Nicolas Yanes is a 73year old y/o male who presents to therapy today with complaints of L (dominant) shoulder pain front and lateral GH pain. Pain started lifting something heavy one month ago. Last year treated for L scapula with RT. Helped.  After seco Mr. Helen Abernathy demonstrates findings of limited ER with bony end feel and possible RC tear with difficulty initiate and lowering shoulder flex and abd. CT scan show destructive mass at scapula. No imaging reports on [address].  Discuss with patient that we will addres Patient will be able to perform shoulder flexion to 120 degrees with AAROM to assist with reducing pain and keeping functional ROM. Frequency / Duration: Patient will be seen for 2 x/week or a total of 5 visits over a 90 day period.  Treatment will inc

## 2017-03-14 ENCOUNTER — APPOINTMENT (OUTPATIENT)
Dept: PHYSICAL THERAPY | Age: 67
End: 2017-03-14
Attending: FAMILY MEDICINE
Payer: MEDICARE

## 2017-03-16 ENCOUNTER — OFFICE VISIT (OUTPATIENT)
Dept: PHYSICAL THERAPY | Age: 67
End: 2017-03-16
Attending: FAMILY MEDICINE
Payer: MEDICARE

## 2017-03-16 PROCEDURE — 97110 THERAPEUTIC EXERCISES: CPT

## 2017-03-16 NOTE — PROGRESS NOTES
Dx: Diagnosis: Left shoulder pain (M25.512)  Other chronic pain (G89.29)           Authorized # of Visits:  4         Next MD visit: none scheduled  Fall Risk: standard         Precautions: Cancer, METS scapula, no imaging on GH joint          Subjective: - ROW with red 20 x  -       - -        - -        Skilled Services: To provide HEP to reduce pain, improve posture and increase ROM below 90 until further medical consult.     Charges:  Ex 3       Total Timed Treatment: 45 min  Total Treatment Time: destructive mass, neuropathy and cold sensitivity, necrotic mass bladder, hand foot syndrome. ASSESSMENT  Mr. Lor House demonstrates findings of limited ER with bony end feel and possible RC tear with difficulty initiate and lowering shoulder flex and abd. integrity. Patient will be able to perform shoulder flexion to 120 degrees with AAROM to assist with reducing pain and keeping functional ROM. Frequency / Duration: Patient will be seen for 2 x/week or a total of 5 visits over a 90 day period.  Treat

## 2017-03-16 NOTE — PROGRESS NOTES
Discharge Summary    Dx: Diagnosis: Left shoulder pain (M25.512)  Other chronic pain (G89.29)           Authorized # of Visits:  4         Next MD visit: none scheduled  Fall Risk: standard         Precautions: Cancer, METS scapula, no imaging on [address] [facility] joint pain. Discharge to continue with HEP.     Current status G Code:  CJ  Goal status G Code: [id number]   Discharge status G Code:      Goals:   Patient will demonstrate independence in performing home exercise program with safety precaution of bone i HO.Patient demonstrate correct technique Forward reach supine 1 lb 2 x 15 x HOme Sidelying shoulder abd 3 x 10 Review HEP    - Sidelying   Shoulder ER 2 x 10 X 1 lb 10 x 1 set  Stop with tensing of muscle L Shoulder add red 2 x 20    L Shoulder ext  Red 2 growth. Current functional limitations include dressing, reaching overhead, reaching behind back, sleeping, cautious AROM. Miley Alexandre describes prior level of function patient has had soreness in shoulder blade with ROM but was not limited in ROM.  Pt goal to perform empty/full can test and O'Briends secondar to pain    Today’s Treatment and Response: Patient education provided on treatment plan and goals. Patient was instructed in and issued a HEP for: pendulum.  Patient demonstrate correct technique  Hailey

## 2017-03-22 ENCOUNTER — OFFICE VISIT (OUTPATIENT)
Dept: HEMATOLOGY/ONCOLOGY | Age: 67
End: 2017-03-22
Attending: INTERNAL MEDICINE
Payer: MEDICARE

## 2017-03-22 VITALS
OXYGEN SATURATION: 98 % | DIASTOLIC BLOOD PRESSURE: 79 MMHG | TEMPERATURE: 97 F | SYSTOLIC BLOOD PRESSURE: 125 MMHG | RESPIRATION RATE: 18 BRPM | BODY MASS INDEX: 24 KG/M2 | HEART RATE: 93 BPM | WEIGHT: 165.38 LBS

## 2017-03-22 DIAGNOSIS — C18.9 MALIGNANT NEOPLASM OF COLON, UNSPECIFIED PART OF COLON (HCC): ICD-10-CM

## 2017-03-22 DIAGNOSIS — C78.7 LIVER METASTASES (HCC): ICD-10-CM

## 2017-03-22 DIAGNOSIS — C18.9 COLON CANCER METASTASIZED TO LIVER (HCC): ICD-10-CM

## 2017-03-22 DIAGNOSIS — C19 CANCER OF RECTOSIGMOID (COLON) (HCC): Primary | ICD-10-CM

## 2017-03-22 DIAGNOSIS — E83.42 HYPOMAGNESEMIA: ICD-10-CM

## 2017-03-22 DIAGNOSIS — C78.7 COLON CANCER METASTASIZED TO LIVER (HCC): ICD-10-CM

## 2017-03-22 LAB
ALBUMIN SERPL-MCNC: 3.1 G/DL (ref 3.5–4.8)
ALP LIVER SERPL-CCNC: 222 U/L (ref 45–117)
ALT SERPL-CCNC: 35 U/L (ref 17–63)
AST SERPL-CCNC: 45 U/L (ref 15–41)
BASOPHILS # BLD AUTO: 0.04 X10(3) UL (ref 0–0.1)
BASOPHILS NFR BLD AUTO: 0.4 %
BILIRUB SERPL-MCNC: 0.5 MG/DL (ref 0.1–2)
BUN BLD-MCNC: 15 MG/DL (ref 8–20)
CALCIUM BLD-MCNC: 8.3 MG/DL (ref 8.3–10.3)
CALCIUM BLD-MCNC: 8.3 MG/DL (ref 8.3–10.3)
CEA: 2411.2 NG/ML (ref 0.5–5)
CHLORIDE: 109 MMOL/L (ref 101–111)
CO2: 27 MMOL/L (ref 22–32)
CREAT BLD-MCNC: 0.7 MG/DL (ref 0.7–1.3)
CREAT BLD-MCNC: 0.7 MG/DL (ref 0.7–1.3)
EOSINOPHIL # BLD AUTO: 0.26 X10(3) UL (ref 0–0.3)
EOSINOPHIL NFR BLD AUTO: 2.6 %
ERYTHROCYTE [DISTWIDTH] IN BLOOD BY AUTOMATED COUNT: 14.7 % (ref 11.5–16)
GLUCOSE BLD-MCNC: 133 MG/DL (ref 70–99)
HAV IGM SER QL: 1 MG/DL (ref 1.7–3)
HCT VFR BLD AUTO: 38.4 % (ref 37–53)
HGB BLD-MCNC: 12.9 G/DL (ref 13–17)
IMMATURE GRANULOCYTE COUNT: 0.04 X10(3) UL (ref 0–1)
IMMATURE GRANULOCYTE RATIO %: 0.4 %
LYMPHOCYTES # BLD AUTO: 1.53 X10(3) UL (ref 0.9–4)
LYMPHOCYTES NFR BLD AUTO: 15 %
M PROTEIN MFR SERPL ELPH: 6.9 G/DL (ref 6.1–8.3)
MCH RBC QN AUTO: 31.6 PG (ref 27–33.2)
MCHC RBC AUTO-ENTMCNC: 33.6 G/DL (ref 31–37)
MCV RBC AUTO: 94.1 FL (ref 80–99)
MONOCYTES # BLD AUTO: 1.16 X10(3) UL (ref 0.1–0.6)
MONOCYTES NFR BLD AUTO: 11.4 %
NEUTROPHIL ABS PRELIM: 7.15 X10 (3) UL (ref 1.3–6.7)
NEUTROPHILS # BLD AUTO: 7.15 X10(3) UL (ref 1.3–6.7)
NEUTROPHILS NFR BLD AUTO: 70.2 %
PLATELET # BLD AUTO: 151 10(3)UL (ref 150–450)
POTASSIUM SERPL-SCNC: 3.8 MMOL/L (ref 3.6–5.1)
RBC # BLD AUTO: 4.08 X10(6)UL (ref 3.8–5.8)
RED CELL DISTRIBUTION WIDTH-SD: 50.9 FL (ref 35.1–46.3)
SODIUM SERPL-SCNC: 140 MMOL/L (ref 136–144)
WBC # BLD AUTO: 10.2 X10(3) UL (ref 4–13)

## 2017-03-22 PROCEDURE — 82310 ASSAY OF CALCIUM: CPT

## 2017-03-22 PROCEDURE — 82378 CARCINOEMBRYONIC ANTIGEN: CPT

## 2017-03-22 PROCEDURE — 83735 ASSAY OF MAGNESIUM: CPT

## 2017-03-22 PROCEDURE — 82565 ASSAY OF CREATININE: CPT

## 2017-03-22 PROCEDURE — 80053 COMPREHEN METABOLIC PANEL: CPT

## 2017-03-22 PROCEDURE — 85025 COMPLETE CBC W/AUTO DIFF WBC: CPT

## 2017-03-22 PROCEDURE — 96413 CHEMO IV INFUSION 1 HR: CPT

## 2017-03-22 PROCEDURE — 96367 TX/PROPH/DG ADDL SEQ IV INF: CPT

## 2017-03-22 RX ORDER — DIPHENHYDRAMINE HCL 25 MG
25 CAPSULE ORAL ONCE
Status: COMPLETED | OUTPATIENT
Start: 2017-03-22 | End: 2017-03-22

## 2017-03-22 RX ORDER — SODIUM CHLORIDE 0.9 % (FLUSH) 0.9 %
10 SYRINGE (ML) INJECTION ONCE
Status: COMPLETED | OUTPATIENT
Start: 2017-03-22 | End: 2017-03-22

## 2017-03-22 RX ORDER — ACETAMINOPHEN 325 MG/1
650 TABLET ORAL ONCE
Status: COMPLETED | OUTPATIENT
Start: 2017-03-22 | End: 2017-03-22

## 2017-03-22 RX ADMIN — ACETAMINOPHEN 650 MG: 325 TABLET ORAL at 10:33:00

## 2017-03-22 RX ADMIN — SODIUM CHLORIDE 0.9 % (FLUSH) 10 ML: 0.9 % SYRINGE (ML) INJECTION at 13:20:00

## 2017-03-22 RX ADMIN — DIPHENHYDRAMINE HCL 25 MG: 25 MG CAPSULE ORAL at 10:33:00

## 2017-03-22 NOTE — PATIENT INSTRUCTIONS
For Dr. Arminda Hurd nurse line, call  [phone] with any questions or concerns Monday through Friday 8:00 to 4:30.   After hours or weekends for emergent needs [phone]

## 2017-03-22 NOTE — PROGRESS NOTES
Education Record    Learner:  Patient    Disease / Diagnosis:colon ca    Barriers / Limitations:  None   Comments:    Method:  Brief focused and Printed material   Comments:    General Topics:  Medication, Side effects and symptom management and Plan of ca

## 2017-03-22 NOTE — PROGRESS NOTES
Pt Mg 1.0. Notified Dr. Isabell Cheatham of result- orders received for pt to continue PO Mag 800mg daily and give 2gm Mag [place]re[place] today. Instructed pt to continue taking PO Mag 800mg. Pt verbalized understanding.

## 2017-03-31 ENCOUNTER — SNF/IP PROF CHARGE ONLY (OUTPATIENT)
Dept: HEMATOLOGY/ONCOLOGY | Facility: HOSPITAL | Age: 67
End: 2017-03-31

## 2017-03-31 DIAGNOSIS — C18.9 COLON CANCER METASTASIZED TO LIVER (HCC): Primary | ICD-10-CM

## 2017-03-31 DIAGNOSIS — C78.7 COLON CANCER METASTASIZED TO LIVER (HCC): Primary | ICD-10-CM

## 2017-03-31 PROCEDURE — G9678 ONCOLOGY CARE MODEL SERVICE: HCPCS | Performed by: INTERNAL MEDICINE

## 2017-04-05 ENCOUNTER — OFFICE VISIT (OUTPATIENT)
Dept: HEMATOLOGY/ONCOLOGY | Age: 67
End: 2017-04-05
Attending: INTERNAL MEDICINE
Payer: MEDICARE

## 2017-04-05 VITALS
TEMPERATURE: 99 F | SYSTOLIC BLOOD PRESSURE: 136 MMHG | HEART RATE: 98 BPM | RESPIRATION RATE: 18 BRPM | BODY MASS INDEX: 24 KG/M2 | DIASTOLIC BLOOD PRESSURE: 87 MMHG | OXYGEN SATURATION: 97 % | WEIGHT: 165.5 LBS

## 2017-04-05 DIAGNOSIS — C78.7 LIVER METASTASES (HCC): ICD-10-CM

## 2017-04-05 DIAGNOSIS — C18.9 MALIGNANT NEOPLASM OF COLON, UNSPECIFIED PART OF COLON (HCC): ICD-10-CM

## 2017-04-05 DIAGNOSIS — C19 CANCER OF RECTOSIGMOID (COLON) (HCC): Primary | ICD-10-CM

## 2017-04-05 DIAGNOSIS — C78.7 COLON CANCER METASTASIZED TO LIVER (HCC): ICD-10-CM

## 2017-04-05 DIAGNOSIS — C18.9 COLON CANCER METASTASIZED TO LIVER (HCC): ICD-10-CM

## 2017-04-05 PROCEDURE — 99213 OFFICE O/P EST LOW 20 MIN: CPT | Performed by: INTERNAL MEDICINE

## 2017-04-05 PROCEDURE — 80053 COMPREHEN METABOLIC PANEL: CPT

## 2017-04-05 PROCEDURE — 85025 COMPLETE CBC W/AUTO DIFF WBC: CPT

## 2017-04-05 PROCEDURE — 96365 THER/PROPH/DIAG IV INF INIT: CPT

## 2017-04-05 PROCEDURE — 82310 ASSAY OF CALCIUM: CPT

## 2017-04-05 PROCEDURE — 83735 ASSAY OF MAGNESIUM: CPT

## 2017-04-05 PROCEDURE — 82565 ASSAY OF CREATININE: CPT

## 2017-04-05 PROCEDURE — 82378 CARCINOEMBRYONIC ANTIGEN: CPT

## 2017-04-05 PROCEDURE — 96413 CHEMO IV INFUSION 1 HR: CPT

## 2017-04-05 RX ORDER — SODIUM CHLORIDE 0.9 % (FLUSH) 0.9 %
10 SYRINGE (ML) INJECTION ONCE
Status: COMPLETED | OUTPATIENT
Start: 2017-04-05 | End: 2017-04-05

## 2017-04-05 RX ORDER — DIPHENHYDRAMINE HCL 25 MG
25 CAPSULE ORAL ONCE
Status: COMPLETED | OUTPATIENT
Start: 2017-04-05 | End: 2017-04-05

## 2017-04-05 RX ORDER — ACETAMINOPHEN 325 MG/1
650 TABLET ORAL ONCE
Status: COMPLETED | OUTPATIENT
Start: 2017-04-05 | End: 2017-04-05

## 2017-04-05 RX ADMIN — SODIUM CHLORIDE 0.9 % (FLUSH) 10 ML: 0.9 % SYRINGE (ML) INJECTION at 12:00:00

## 2017-04-05 RX ADMIN — ACETAMINOPHEN 650 MG: 325 TABLET ORAL at 09:36:00

## 2017-04-05 RX ADMIN — DIPHENHYDRAMINE HCL 25 MG: 25 MG CAPSULE ORAL at 09:36:00

## 2017-04-05 NOTE — PROGRESS NOTES
Education Record    Learner:  Patient    Disease / Diagnosis: colon ca    Barriers / Limitations:  None   Comments:    Method:  Reinforcement   Comments:    General Topics:  Side effects and symptom management and Plan of care reviewed   Comments:    Ambreen Petersen

## 2017-04-05 NOTE — PROGRESS NOTES
Education Record    Learner:  Patient    Disease / Diagnosis:    Barriers / Limitations:  None   Comments:    Method:  Discussion   Comments:    General Topics:  Medication, Side effects and symptom management and Plan of care reviewed   Comments:    Suleman Santos

## 2017-04-05 NOTE — PROGRESS NOTES
Cancer Center Progress Note  Patient Name: Patel Alonso   YOB: 1950   Medical Record Number: [id number]   Attending Physician: Pepper Shultz M.D.   [facility]  Co-Medical Director, Moni Murcia Lisandra extraction    COLONOSCOPY      NEEDLE BIOPSY LIVER      PORT, INDWELLING, IMP      PART REMOVAL COLON W END COLOSTOMY      Comment stoma    OTHER SURGICAL HISTORY  1/2016    Comment left shoulder biopsy       Family History:  Family History   Problem Relat bruising or bleeding. Denies tender or palpable lymph nodes. Respiratory No dyspnea, pleuritic chest pain, cough or hemoptysis. Cardiovascular No anginal chest pain, palpitations or orthopnea.    Gastrointestinal No nausea, vomiting, diarrhea, GI bleed CREATSERUM 0.71   CA 8.5   ALB 3.0 L      K 4.2      CO2 26.0   ALKPHO 205 H   AST 40   ALT 28   BILT 0.4   TP 6.8   MG        0.9    Lab Component   Component Value Date/Time    CEA 2411.2* 03/22/2017 0950    CEA 84864.9* 02/25/2014 163 decreased ROM: Secondary to calcification related to RT. Improved with PT    L Iliac Vein DVT: The patient is asymptomatic. Continue xarelto. Hypomagnesemia: Secondary to chemo. IV mag rider today. Increase oral mag as well.  Pt cautioned that the in

## 2017-04-10 ENCOUNTER — TELEPHONE (OUTPATIENT)
Dept: HEMATOLOGY/ONCOLOGY | Facility: HOSPITAL | Age: 67
End: 2017-04-10

## 2017-04-10 ENCOUNTER — HOSPITAL ENCOUNTER (OUTPATIENT)
Dept: CT IMAGING | Age: 67
Discharge: HOME | DRG: 176 | End: 2017-04-10
Attending: INTERNAL MEDICINE
Payer: MEDICARE

## 2017-04-10 ENCOUNTER — HOSPITAL ENCOUNTER (INPATIENT)
Facility: HOSPITAL | Age: 67
LOS: 1 days | Discharge: HOME | DRG: 176 | End: 2017-04-11
Attending: EMERGENCY MEDICINE | Admitting: HOSPITALIST
Payer: MEDICARE

## 2017-04-10 DIAGNOSIS — C78.7 COLON CANCER METASTASIZED TO LIVER (HCC): ICD-10-CM

## 2017-04-10 DIAGNOSIS — C18.9 COLON CANCER METASTASIZED TO LIVER (HCC): ICD-10-CM

## 2017-04-10 DIAGNOSIS — C19 CANCER OF RECTOSIGMOID (COLON) (HCC): ICD-10-CM

## 2017-04-10 DIAGNOSIS — C78.7 LIVER METASTASES (HCC): ICD-10-CM

## 2017-04-10 DIAGNOSIS — I26.99 OTHER ACUTE PULMONARY EMBOLISM WITHOUT ACUTE COR PULMONALE (HCC): Primary | ICD-10-CM

## 2017-04-10 PROCEDURE — 74177 CT ABD & PELVIS W/CONTRAST: CPT

## 2017-04-10 PROCEDURE — 99223 1ST HOSP IP/OBS HIGH 75: CPT | Performed by: HOSPITALIST

## 2017-04-10 PROCEDURE — 71260 CT THORAX DX C+: CPT

## 2017-04-10 RX ORDER — ONDANSETRON 2 MG/ML
4 INJECTION INTRAMUSCULAR; INTRAVENOUS EVERY 4 HOURS PRN
Status: DISCONTINUED | OUTPATIENT
Start: 2017-04-10 | End: 2017-04-11

## 2017-04-10 RX ORDER — AMLODIPINE BESYLATE 5 MG/1
5 TABLET ORAL DAILY
Status: DISCONTINUED | OUTPATIENT
Start: 2017-04-11 | End: 2017-04-11

## 2017-04-10 RX ORDER — SODIUM CHLORIDE 9 MG/ML
INJECTION, SOLUTION INTRAVENOUS CONTINUOUS
Status: DISCONTINUED | OUTPATIENT
Start: 2017-04-10 | End: 2017-04-11

## 2017-04-10 RX ORDER — ENOXAPARIN SODIUM 100 MG/ML
1 INJECTION SUBCUTANEOUS EVERY 12 HOURS SCHEDULED
Status: DISCONTINUED | OUTPATIENT
Start: 2017-04-10 | End: 2017-04-11

## 2017-04-10 RX ORDER — SODIUM CHLORIDE 9 MG/ML
INJECTION, SOLUTION INTRAVENOUS CONTINUOUS
Status: ACTIVE | OUTPATIENT
Start: 2017-04-10 | End: 2017-04-10

## 2017-04-11 ENCOUNTER — APPOINTMENT (OUTPATIENT)
Dept: ULTRASOUND IMAGING | Facility: HOSPITAL | Age: 67
DRG: 176 | End: 2017-04-11
Attending: HOSPITALIST
Payer: MEDICARE

## 2017-04-11 ENCOUNTER — APPOINTMENT (OUTPATIENT)
Dept: CV DIAGNOSTICS | Facility: HOSPITAL | Age: 67
DRG: 176 | End: 2017-04-11
Attending: HOSPITALIST
Payer: MEDICARE

## 2017-04-11 VITALS
BODY MASS INDEX: 22.35 KG/M2 | SYSTOLIC BLOOD PRESSURE: 133 MMHG | HEIGHT: 72 IN | DIASTOLIC BLOOD PRESSURE: 83 MMHG | TEMPERATURE: 98 F | WEIGHT: 165 LBS | RESPIRATION RATE: 18 BRPM | HEART RATE: 100 BPM | OXYGEN SATURATION: 99 %

## 2017-04-11 PROCEDURE — 93970 EXTREMITY STUDY: CPT

## 2017-04-11 PROCEDURE — 99223 1ST HOSP IP/OBS HIGH 75: CPT | Performed by: INTERNAL MEDICINE

## 2017-04-11 PROCEDURE — 93306 TTE W/DOPPLER COMPLETE: CPT | Performed by: INTERNAL MEDICINE

## 2017-04-11 PROCEDURE — 99239 HOSP IP/OBS DSCHRG MGMT >30: CPT | Performed by: HOSPITALIST

## 2017-04-11 NOTE — PLAN OF CARE
Pt arrived from ED into 7604 @ 2200. Oriented to room, admission Navigator completed. Dr. Raciel Bunch on unit to see pt, orders reviewed. Pt is a/o x 4, [place]. Clear lungs, room air, sats > 93%. Denies CP, SOB. NSR/ ST on tele.    Colostomy intact, lo

## 2017-04-11 NOTE — TELEPHONE ENCOUNTER
Was called by radiology given CT results showing acute right sided PE. I called patient and told him results and recommended he go to the ER for evaluation and admission to start on anticoagulation. He said he will go to the ED as soon as he finds a ride.

## 2017-04-11 NOTE — PLAN OF CARE
Patient discharged in stable condition. Discharged instructions and handout reviewed with patient, all questions answered.      CARDIOVASCULAR - ADULT    • Maintains optimal cardiac output and hemodynamic stability Adequate for Discharge    • Absence of car

## 2017-04-11 NOTE — PROGRESS NOTES
04/11/17 1519   Clinical Encounter Type   Visited With Patient   Routine Visit Introduction   Continue Visiting No   Rastafari Encounters   Spiritual Requests During Visit / Hospitalization [address]

## 2017-04-11 NOTE — CONSULTS
[facility]    Report of Consultation    Anita Ernestina Ely Patient Status:  Inpatient    10/22/1950 MRN [id number]   [facility] 7NE-A Attending Chang Espinoza[address] Day # 1 PCP Butch Muniz MD     Date of Admission:  4/10/20 the CT, multiple small PE's were noted. The patient was called and referred to the ER for anticoagulation. It is not clear when the patient discontinued his Xarelto, but he reports that he has not been taking it since his last script ran out.   He denies a JVD. No palpable lymphadenopathy. Neck is supple. Chest: Clear to auscultation. Heart: Regular rate and rhythm. Abdomen: Soft, non tender with good bowel sounds. Extremities: Pedal pulses are present. No edema. Neurological: Grossly intact.     L without acute cor pulmonale (HCC)     Other acute pulmonary embolism without acute cor pulmonale (HCC)     Malignant neoplasm of colon (HCC)     HTN, goal below 130/80      PE: The patient has suffered PEs of the RLL.   They are described as \"relatively ac

## 2017-04-11 NOTE — PROGRESS NOTES
04/11/17 1033   Clinical Encounter Type   Referral From Nurse   Referral To ([facility]  for iCAD as per the consult. )    attempted to visit; however, the patient was going through a test at the time.  Respected his time a

## 2017-04-11 NOTE — ED PROVIDER NOTES
Patient Seen in: [facility] Emergency Department    History   Patient presents with:  Abnormal Result (metabolic, cardiac)    Stated Complaint: +ct PE    HPI    This is a 18-year-old female who was sent here for abnormality seen on the CT.   The seven PE  Other systems are as noted in HPI. Constitutional and vital signs reviewed. All other systems reviewed and negative except as noted above. PSFH elements reviewed from today and agreed except as otherwise stated in HPI.     Physical Exam     ED comprehensive shows no significant abnormalities. PT PTT was not significantly elevated except for the PTT which was elevated. CBC shows a hemoglobin of 12.6. I discussed this case with Dr. Mary Santana.   I have also discussed this case with Dr. Jasmyn Rosado the

## 2017-04-11 NOTE — H&P
[facility]  History and Physical     Roro Number Patient Status:  Inpatient    10/22/1950 MRN [id number]   [facility] 7NE-A Attending Alicia Hook, DO   Hosp Day # 0 PCP Kelsey Diaz MD     Chief Complaint: Abnormal CT    His Anicteric. Neck: No JVD. No carotid bruits. Respiratory: Clear to auscultation bilaterally. No wheezes. No rhonchi. Cardiovascular: S1, S2. Regular rate and rhythm. No murmurs, rubs or gallops. Equal pulses.    Chest and Back: No tenderness or deformity

## 2017-04-12 ENCOUNTER — PATIENT OUTREACH (OUTPATIENT)
Dept: CASE MANAGEMENT | Age: 67
End: 2017-04-12

## 2017-04-12 DIAGNOSIS — C18.9 COLON CANCER METASTASIZED TO LIVER (HCC): ICD-10-CM

## 2017-04-12 DIAGNOSIS — C79.51 BONE METASTASES (HCC): Primary | ICD-10-CM

## 2017-04-12 DIAGNOSIS — C78.7 COLON CANCER METASTASIZED TO LIVER (HCC): ICD-10-CM

## 2017-04-12 DIAGNOSIS — I26.99 OTHER ACUTE PULMONARY EMBOLISM WITHOUT ACUTE COR PULMONALE (HCC): ICD-10-CM

## 2017-04-12 NOTE — DISCHARGE SUMMARY
[facility] HOSPITALIST  DISCHARGE SUMMARY     Rafia Ely Patient Status:  Inpatient    10/22/1950 MRN [id number]   [facility] 7NE-A Attending No att. providers found   Hosp Day # 1 PCP Jo Ann Balbuena MD     Date of Admission: 4/10/2017  Date DVT.    Procedures during hospitalization:   • None    Incidental or significant findings and recommendations (brief descriptions):  • None    Lab/Test results pending at Discharge:   · None    Consultants:  • Oncology-Dr. Mj Gary    Discharge Medication DISCHARGE INSTRUCTIONS: See electronic chart    Marco Franco DO 4/12/2017    Time spent:  > 30 minutes

## 2017-04-12 NOTE — PROGRESS NOTES
Initial Post Discharge Follow Up   Discharge Date: 4/11/17  Contact Date: 4/12/2017    Consent Verification:  Assessment Completed With: Patient  HIPAA Verified? Yes    1.  Tell me why you were in the hospital?  I received a call from my cancer doctor that 11. Are you able to do normal activities as you did prior to your hospitalization? No-not yet    12. In need of referral for:  none at present.     13. Do you have a follow up visit scheduled with your Primary Care Physician or Specialist?  Per Shahrzad uriarte

## 2017-04-19 NOTE — PATIENT INSTRUCTIONS
For Dr. Janine Sabillon nurse line, call  [phone] with any questions or concerns Monday through Friday 8:00 to 4:30.   After hours or weekends for emergent needs [phone]

## 2017-04-21 NOTE — PROGRESS NOTES
HPI:    Court Ahuja is a 73year old male her today for hospital follow up. Discharge Summary was obtained and reviewed.       Issues and concerns since discharge:  None  Was in hospital for acute PE with history of colon cancer with mets, chemothera shoulder biopsy      Family History   Problem Relation Age of Onset   • Adopted: Yes        Social History   Marital Status: Single  Spouse Name: N/A    Years of Education: N/A  Number of Children: 0     Occupational History  RETIRED  AT [employer] every 8 (eight) hours as needed. Dispense: 20 tablet; Refill: 0    4. HTN, goal below 130/80  -CPM, stable    5. Other acute pulmonary embolism without acute cor pulmonale ([address]  -continue with eliquis. 6. Hypomagnesemia  -watching levels, stable.    P

## 2017-04-26 NOTE — PROGRESS NOTES
Cancer Center Progress Note  Patient Name: Regine Gilliland   YOB: 1950   Medical Record Number: [id number]   Attending Physician: Mario Negro M.D.   [facility]  Co-Medical Director, [facility]andra restarted on Xarelto and is tolerating it well.       History of Present Illness   Júnior Tierney is a 73year old male with metastatic colorectal cancer, as above, who presents for follow up.   HE reports increased pain in the L shoulder, which is one are tablet, Rfl: 3  •  Prochlorperazine Maleate (COMPAZINE) 10 mg tablet, Take 1 tablet (10 mg total) by mouth every 6 (six) hours as needed for Nausea., Disp: 30 tablet, Rfl: 3  •  Ondansetron HCl (ZOFRAN) 8 MG tablet, Take 1 tablet (8 mg total) by mouth ever gallops or rubs. Abdomen Normal - Non-tender, non-distended, no masses, ascites or hepatosplenomegaly.     Extremities Normal - No C/C/E    Integumentary Normal - No rashes, No Jaundice   Neurologic Normal - No sensory or motor deficits, normal cerebellar tolerated it pretty well. His CEA cruz and CT in May revealed progression. Lonsurf was ordered at that time, but due to insurance delay, didn't arrive until July. He tolerated it well, but his CEA continued to rise. Restaging CT revealed progression.  He

## 2017-04-26 NOTE — PROGRESS NOTES
Education Record    Learner:  Patient    Disease / Diagnosis:colon ca    Barriers / Limitations:  None   Comments:    Method:  Discussion   Comments:    General Topics:  Medication , plan of care   Comments:    Outcome:  Shows understanding   Comments:

## 2017-04-26 NOTE — PROGRESS NOTES
Education Record    Learner:  Patient    Disease / Diagnosis:    Barriers / Limitations:  None   Comments:    Method:  Discussion   Comments:    General Topics:  Medication, Side effects and symptom management and Plan of care reviewed   Comments:    Michelle Mayfield

## 2017-04-28 NOTE — PROGRESS NOTES
Date of Treatment: 4/26/17                               Type of Chemo: FOLFOX    Comments: \"I started to have cold sensitivity today. \" Pt here for pump d/c. He states that he feels good overall. He denies significant fatigue.  Reports eating OK- drinks s

## 2017-05-10 NOTE — PROGRESS NOTES
Education Record  Learner:  Patient  Disease / Diagnosis:   Colon cancer  Barriers / Limitations:  None  Method:  Printed material and Reinforcement  General Topics:  Plan of care reviewed; schedule; side effects; when to call  Outcome:  Shows understandin

## 2017-05-10 NOTE — PROGRESS NOTES
Cancer Center Progress Note  Patient Name: Guerita Cousins   YOB: 1950   Medical Record Number: [id number]   Attending Physician: Willie Kohli M.D.   [facility]  Co-Medical Director, [facility]andra progressed on Regorafenib and was transitioned to [employer]. In October, 2016, CT revealed progression and he was transitioned to Vectibix. 4/10/17 CT revealed progression of disease and multiple small PEs.    He was restarted on Xarelto and is tolerati Medications:    Current outpatient prescriptions:   •  Pantoprazole Sodium 40 MG Oral Tab EC, Take 1 tablet (40 mg total) by mouth daily. , Disp: 30 tablet, Rfl: 3  •  Rivaroxaban (XARELTO) 20 MG Oral Tab, Take 1 tablet (20 mg total) by mouth daily with jonny [id number])      Physical Examination:    Constitutional Normal - Mood and affect appropriate. Appears close to chronological age. Well nourished. Well developed. Eyes Normal - Conjunctivae and sclerae are clear and without icterus.  Pupils are reactive and equ delay and a week on/week off schedule, he tolerated cycles 4, 5 and 6 well. CEA and CT C/A/P revealed progression. Irinotecan was added to his regimen.  He did not tolerate 2 weeks of Xeloda in a row in the past, so we continued the the 1 week on 1 week of Xarelto after his DVT, but he has restarted it and is tolerating it well. Continue xarelto indefinately. Hypomagnesemia: Secondary to chemo. Mg pending today.     Planned Follow Up:  2 weeks    High risk, Metastatic colon cancer on Chemotherapy and pul

## 2017-05-10 NOTE — PROGRESS NOTES
Education Record    Learner:  Patient    Disease / Diagnosis:    Barriers / Limitations:  None   Comments:    Method:  Discussion   Comments:    General Topics:  Medication, Precautions, Side effects and symptom management and Plan of care reviewed   Juan

## 2017-05-12 NOTE — PROGRESS NOTES
Reviewed methods to decrease sun exposure and burning- pt plans on gardening this weekend. He was instructed to wear long sleeves, hat and apply sun screen. Pt verbalized understanding.

## 2017-05-24 NOTE — PROGRESS NOTES
Pt tolerated remaining oxaliplatin infusion. No c/o. No itching, no redness, no shaking and denied heart racing. Pt discharged.

## 2017-05-24 NOTE — PATIENT INSTRUCTIONS
To reach Dr Galdino peterson during business hours, please call [phone]. After hours, including weekends, evenings, and holidays, please call the main number [phone] for emergent needs.

## 2017-05-24 NOTE — PROGRESS NOTES
Cancer Center Progress Note  Patient Name: Tea Potter   YOB: 1950   Medical Record Number: [id number]   Attending Physician: [facility]  Co-Medical Director, Brenda [facility] Lisandra Comfort Mays is a 73year old male with metastatic colorectal cancer, as above, who presents for follow up and cycle 3 of FOLFOX. He reports that he is feeling much better after this cycle. The shoulder pain is \"90%\" gone.   His energy leve needed for Nausea., Disp: 30 tablet, Rfl: 3  •  Magnesium 100 MG Oral Tab, Take 400 mg by mouth daily. , Disp: , Rfl:   •  AmLODIPine Besylate (NORVASC) 5 MG Oral Tab, Take 1 tablet (5 mg total) by mouth daily. , Disp: 60 tablet, Rfl: 3  •  Prochlorperazine - RRR, no murmurs, gallops or rubs. Abdomen Normal - Non-tender, non-distended, no masses, ascites or hepatosplenomegaly.     Extremities Normal - No C/C/E    Integumentary Normal - No rashes, No Jaundice   Neurologic Normal - No sensory or motor deficits tolerated it pretty well. His CEA cruz and CT in May revealed progression. Lonsurf was ordered at that time, but due to insurance delay, didn't arrive until July. He tolerated it well, but his CEA continued to rise. Restaging CT revealed progression.  He [facility]  Co-Medical Director, [facility]

## 2017-05-24 NOTE — PROGRESS NOTES
Education Record    Learner:  Patient    Disease / Diagnosis:    Barriers / Limitations:  None   Comments:    Method:  Discussion   Comments:    General Topics:  Medication, Side effects and symptom management and Plan of care reviewed   Comments:    Elba Arriaga

## 2017-05-24 NOTE — PROGRESS NOTES
33cc into infusion pt c/o heart racing, itching, red hands and shaking hands. At 0918 infusion stopped. 94/62 140 96.8 RR 20. Veronica Engman 9NS infusing pt on 2Lnc. Dr Abby Carrasco pt. Ordered Benedryl 25mg and Solucortef 100mg which were given.  Pt reported feel

## 2017-06-07 NOTE — PROGRESS NOTES
Cancer Center Progress Note  Patient Name: Evelio Aly   YOB: 1950   Medical Record Number: [id number]   Attending Physician: Manju Mancuso M.D.   [facility]  Co-Medical Director, [facility] Lisandra History of Present Illness   January Claire is a 73year old male with metastatic colorectal cancer, as above, who presents for follow up and cycle 4 of FOLFOX.  He had an infusion reaction to the oxaliplatin last visit, resolved with steroids and he was a •  AmLODIPine Besylate (NORVASC) 5 MG Oral Tab, Take 1 tablet (5 mg total) by mouth daily. , Disp: 60 tablet, Rfl: 3  •  hydrocodone-acetaminophen (NORCO) 7.5-325 MG Oral Tab, Take 1 tablet by mouth every 8 (eight) hours as needed. , Disp: 20 tablet, Rfl: 0 Hematologic/Lymphatic Normal - No petechiae or purpura. No tender or palpable lymph nodes in the cervical, supraclavicular, axillary or inguinal area. Respiratory Normal - Lungs are clear to auscultation, no rhonchi or wheezing.    Cardiovascular Normal MCHC Latest Ref Range: 31.0-37.0 g/dL 33.2   MCV Latest Ref Range: 80.0-99.0 fL 94.5   RDW Latest Ref Range: 11.5-16.0 % 16.8 (H)   RDW-SD Latest Ref Range: 35.1-46.3 fL 56.6 (H)   Prelim Neutrophil Abs Latest Ref Range: 1.30-6.70 x10 (3) uL 1.61   Neutrop 1.  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 He has undergone resection of the necrotic primary and has recovered.   Given the persistence of the neuropathy and col will likely become a problem again. Will reduce the dose to try to reduce that risk. We reviewed the risks, benefits, and side effects and he agreed to proceed. He tolerated cycle 1, but had dark stools that were somewhat concerning for bleeding.   Unfort

## 2017-06-07 NOTE — PATIENT INSTRUCTIONS
For Dr. Isabell Cheatham nurse line, call  [phone] with any questions or concerns Monday through Friday 8:00 to 4:30.   After hours or weekends for emergent needs [phone]

## 2017-06-07 NOTE — PROGRESS NOTES
Education Record    Learner:  Patient    Disease / Diagnosis:colon    Barriers / Limitations:  None   Comments:    Method:  Brief focused and Printed material   Comments:    General Topics:  Medication, Side effects and symptom management and Plan of care

## 2017-06-07 NOTE — PROGRESS NOTES
Education Record    Learner:  Patient    Disease / Diagnosis:    Barriers / Limitations:  None   Comments:    Method:  Discussion   Comments:    General Topics:  Medication, Side effects and symptom management and Plan of care reviewed   Comments:    Sherine Andrea

## 2017-06-21 NOTE — PROGRESS NOTES
Education Record    Learner:  Patient    Disease / Diagnosis:    Barriers / Limitations:  None   Comments:    Method:  Discussion   Comments:    General Topics:  Medication, Side effects and symptom management and Plan of care reviewed   Comments:    Shahid Barreto

## 2017-06-21 NOTE — PROGRESS NOTES
Cancer Center Progress Note  Patient Name: Nicolas Yanes   YOB: 1950   Medical Record Number: [id number]   Attending Physician: Fabiana Rogel M.D.   [facility]  Co-Medical Director, [facility]andra Present Illness   Jethro Marie is a 73year old male with metastatic colorectal cancer, as above, who presents for follow up and cycle 5 of FOLFOX. He tolerated cycle 4 well with Solucortef prior to his oxaliplatin infusion.   He continues to complain of tablet (10 mg total) by mouth every 6 (six) hours as needed for Nausea., Disp: 30 tablet, Rfl: 3  •  Ondansetron HCl (ZOFRAN) 8 MG tablet, Take 1 tablet (8 mg total) by mouth every 8 (eight) hours as needed for Nausea., Disp: 30 tablet, Rfl: 3  •  Magnesiu inguinal area. Respiratory Normal - Lungs are clear to auscultation, no wheezing. Cardiovascular Normal - Regular rate and rhythm, no murmurs. Abdomen Normal - Non-tender, non-distended, no masses, ascites or hepatosplenomegaly.     Extremities Normal the liver. Shoulder biopsy reveals metastatic colon cancer. He completed palliative RT to the shoulder and 3 cycles of of Regorafenib. He tolerated it pretty well. His CEA cruz and CT in May revealed progression.   Lonsurf was ordered at that time, but d cancer on Chemotherapy and pulmonary emboli. Rubi Che M.D.   [facility]  Co-Medical Director, [facility]

## 2017-07-05 NOTE — PATIENT INSTRUCTIONS
For Dr. Bernice Proctor nurse line, call  [phone] with any questions or concerns Monday through Friday 8:00 to 4:30.   After hours or weekends for emergent needs [phone]

## 2017-07-06 NOTE — PROGRESS NOTES
Cancer Center Progress Note  Patient Name: Tea Potter   YOB: 1950   Medical Record Number: [id number]   Attending Physician: Tera Romero M.D.   [facility]  Co-Medical Director, [facility] Lisandra and FOLFOX was restarted.  Cycle 3 was complicated by an infusion reaction and Solucortef was added to the premedications.       History of Present Illness   January Claire is a 73year old male with metastatic colorectal cancer, as above, who presents for hydrocodone-acetaminophen (NORCO) 7.5-325 MG Oral Tab, Take 1 tablet by mouth every 8 (eight) hours as needed. , Disp: 20 tablet, Rfl: 0  •  Prochlorperazine Maleate (COMPAZINE) 10 mg tablet, Take 1 tablet (10 mg total) by mouth every 6 (six) hours as neede nourished. Well developed. Eyes Normal - Conjunctivae and sclerae are clear and without icterus. Pupils are reactive and equal.   Hematologic/Lymphatic Normal - No petechiae or purpura.   No tender or palpable lymph nodes in the cervical, supraclavicular, Cycle 6 today with the same premedication. L shoulder pain and decreased ROM: Secondary to progression of disease. Improving with therapy. PE: The patient is asymptomatic.  It is unclear why he stopped the Xarelto after his DVT, but he has restarte

## 2017-07-19 NOTE — PROGRESS NOTES
Education Record    Learner:  Patient    Disease / Diagnosis:    Barriers / Limitations:  None   Comments:    Method:  Discussion   Comments:    General Topics:  Medication, Side effects and symptom management and Plan of care reviewed   Comments:    Clarisa Crespo

## 2017-07-19 NOTE — PROGRESS NOTES
Education Record    Learner:  Patient    Disease / Diagnosis:colon ca    Barriers / Limitations:  None   Comments:    Method:  Discussion   Comments:    General Topics:  Medication, Side effects and symptom management and Plan of care reviewed   Comments:

## 2017-07-19 NOTE — PROGRESS NOTES
Cancer Center Progress Note  Patient Name: Romana Curling   YOB: 1950   Medical Record Number: [id number]   Attending Physician: [facility]  Co-Medical Director, Kelsey [facility] Lisandra and FOLFOX was restarted.  Cycle 3 was complicated by an infusion reaction and Solucortef was added to the premedications.       History of Present Illness   Benigno Carrillo is a 73year old male with metastatic colorectal cancer, as above, who presents for every 8 (eight) hours as needed. , Disp: 20 tablet, Rfl: 0  •  Magnesium 100 MG Oral Tab, Take 400 mg by mouth daily. , Disp: , Rfl:   •  AmLODIPine Besylate (NORVASC) 5 MG Oral Tab, Take 1 tablet (5 mg total) by mouth daily. , Disp: 60 tablet, Rfl: 3  •  Pro without icterus. Pupils are reactive and equal.   Hematologic/Lymphatic Normal - No petechiae or purpura. No tender or palpable lymph nodes in the cervical, supraclavicular, axillary or inguinal area.    Respiratory Normal - Lungs are clear to auscultation on therapy. Will proceed with Cycle 7 today with the same premedication. L shoulder pain and decreased ROM: Secondary to metastases. Improving with chemotherapy. PE: The patient is asymptomatic.  It is unclear why he stopped the Xarelto after his D

## 2017-08-02 NOTE — PROGRESS NOTES
Cancer Center Progress Note  Patient Name: Kathleen Will   YOB: 1950   Medical Record Number: [id number]   Attending Physician: [facility]  Co-Medical Director, Shaunna Murcia Lisandra and FOLFOX was restarted.  Cycle 3 was complicated by an infusion reaction and Solucortef was added to the premedications.       History of Present Illness   Carly Mays is a 73year old male with metastatic colorectal cancer, as above, who presents for Take 1 tablet by mouth every 8 (eight) hours as needed. , Disp: 20 tablet, Rfl: 0  •  Prochlorperazine Maleate (COMPAZINE) 10 mg tablet, Take 1 tablet (10 mg total) by mouth every 6 (six) hours as needed for Nausea., Disp: 30 tablet, Rfl: 3  •  Ondansetron and without icterus. Pupils are reactive and equal.   Hematologic/Lymphatic Normal - No petechiae or purpura. No tender or palpable lymph nodes in the cervical, supraclavicular, axillary or inguinal area.    Respiratory Normal - Lungs are clear to ausculta reaction to oxaliplatin. Subsequent cycles have been tolerated well with solucortef prior to the oxaliplatin infusion. His CEA is falling on therapy. Will proceed with Cycle 8 today with the same premedication. Continue to follow CEA with each cycle.

## 2017-08-02 NOTE — PROGRESS NOTES
Education Record    Learner:  Patient    Disease / Diagnosis:    Barriers / Limitations:  None   Comments:    Method:  Discussion   Comments:    General Topics:  Medication, Side effects and symptom management and Plan of care reviewed   Comments:    Lisa Luciano

## 2017-08-16 NOTE — PROGRESS NOTES
Education Record  Learner:  Patient  Disease / Diagnosis:   Colon cancer  Barriers / Limitations:  None  Method:  Printed material and Reinforcement  General Topics:  Plan of care reviewed; schedule; side effects   Outcome:  Shows understanding and Patient

## 2017-08-16 NOTE — PROGRESS NOTES
Cancer Center Progress Note  Patient Name: Gail List   YOB: 1950   Medical Record Number: [id number]   Attending Physician: [facility]  Co-Medical Director, [facility] complicated by an infusion reaction and Solucortef was added to the premedications.       History of Present Illness   January Claire is a 73year old male with metastatic colorectal cancer, as above, who presents for follow up and cycle 9 of palliative F by mouth every 8 (eight) hours as needed. , Disp: 20 tablet, Rfl: 0  •  Prochlorperazine Maleate (COMPAZINE) 10 mg tablet, Take 1 tablet (10 mg total) by mouth every 6 (six) hours as needed for Nausea., Disp: 30 tablet, Rfl: 3  •  Ondansetron HCl (ZOFRAN) 8 cervical, supraclavicular, axillary or inguinal area. Respiratory Normal - Lungs CTA, no rhonchi or wheezing. Cardiovascular Normal - RRR, no murmurs, gallops or rubs.    Abdomen Normal - Non-tender, non-distended, no masses, ascites or hepatosplenomega asymptomatic. It is unclear why he stopped the Xarelto after his DVT, but he has restarted it and is tolerating it well. No further evidence of blood in the stool. Continue xarelto indefinately.        Planned Follow Up:  2 weeks    High risk, Metastatic co

## 2017-08-16 NOTE — PROGRESS NOTES
Pt here for 2 week MD f/u and due for chemo. Pt's energy level and appetite are great. Pt has gained a little weight. Denies pain. Pt has no bowel issues or nausea. Pt has no further complaints.

## 2017-08-30 NOTE — PATIENT INSTRUCTIONS
To reach Dr Marsha peterson during business hours, please call [phone]. After hours, including weekends, evenings, and holidays, please call the main number [phone] for emergent needs.

## 2017-08-30 NOTE — PROGRESS NOTES
Cancer Center Progress Note  Patient Name: Timothy Edwards   YOB: 1950   Medical Record Number: [id number]   Attending Physician: [facility]  Co-Medical Director, Norman Murcia Lisandra and Solucortef was added to the premedications.       History of Present Illness   Juventino Lassiter is a 73year old male with metastatic colorectal cancer, as above, who presents for follow up and cycle 10 of palliative FOLFOX. He has no shoulder pain.   He mouth every 6 (six) hours as needed for Nausea., Disp: 30 tablet, Rfl: 3  •  Ondansetron HCl (ZOFRAN) 8 MG tablet, Take 1 tablet (8 mg total) by mouth every 8 (eight) hours as needed for Nausea., Disp: 30 tablet, Rfl: 3  •  Magnesium 100 MG Oral Tab, Take clear to auscultation, no rhonchi or wheezing. Cardiovascular Normal - Regular rate and rhythm, no murmurs, gallops or rubs. Abdomen Normal - Non-tender, non-distended, no masses, ascites or hepatosplenomegaly.     Extremities Normal - No visible deform chemotherapy. PE: The patient is asymptomatic. It is unclear why he stopped the Xarelto after his DVT, but he has restarted it and is tolerating it well. No further evidence of blood in the stool. Continue xarelto indefinately.      Thrombocytopenia: Sec

## 2017-09-13 NOTE — PATIENT INSTRUCTIONS
Dr. Flakita Hamilton Monday through Friday 8-4:30:  [phone]    After hours or weekends for emergecy:  [phone].      To schedule Cancer Center appointment: [phone]

## 2017-09-25 NOTE — PROGRESS NOTES
Cancer Center Progress Note  Patient Name: Benigno Bracket   YOB: 1950   Medical Record Number: [id number]   Attending Physician: Yolie Marquez M.D.   [facility]  Co-Medical Director, [facility] Lisandra and FOLFOX was restarted.  Cycle 3 was complicated by an infusion reaction and Solucortef was added to the premedications.       History of Present Illness   Raul Campoverde is a 73year old male with metastatic colorectal cancer, as above, who presents for Take 1 tablet by mouth every 8 (eight) hours as needed. , Disp: 20 tablet, Rfl: 0  •  Magnesium 100 MG Oral Tab, Take 400 mg by mouth daily. , Disp: , Rfl:   •  Prochlorperazine Maleate (COMPAZINE) 10 mg tablet, Take 1 tablet (10 mg total) by mouth every 6 ( area.   Respiratory Normal - Lungs are clear to auscultation, no wheezing. Cardiovascular Normal - Regular rate and rhythm, no murmurs. Abdomen Normal - Non-tender, non-distended, no masses, ascites or hepatosplenomegaly.     Extremities Normal - No cya 46.3 fL 63.3 (H)   Prelim Neutrophil Abs Latest Ref Range: 1.30 - 6.70 x10 (3) uL 4.19   Neutrophils Absolute Latest Ref Range: 1.30 - 6.70 x10(3) uL 4.19   Lymphocytes Absolute Latest Ref Range: 0.90 - 4.00 x10(3) uL 1.58   Monocytes Absolute Latest Ref R it and is tolerating it well. No further evidence of blood in the stool. Continue xarelto indefinitely. 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 OK to proceed with treatment today.       Planned Follow Up:  2 weeks    High risk, Metastatic colon cancer on

## 2017-09-27 NOTE — PROGRESS NOTES
Cancer Center Progress Note  Patient Name: Shawn Bolanos   YOB: 1950   Medical Record Number: [id number]   Attending Physician: [facility]  Co-Medical Director, Rosalina Murcia Lisandra reaction and Solucortef was added to the premedications.       History of Present Illness   Remy Stubbs is a 73year old male with metastatic colorectal cancer, as above, who presents for follow up and cycle 12 of palliative FOLFOX.  He has no shoulder 7.5-325 MG Oral Tab, Take 1 tablet by mouth every 8 (eight) hours as needed. , Disp: 20 tablet, Rfl: 0  •  Prochlorperazine Maleate (COMPAZINE) 10 mg tablet, Take 1 tablet (10 mg total) by mouth every 6 (six) hours as needed for Nausea., Disp: 30 tablet, Rf palpable lymph nodes in the cervical, supraclavicular, axillary or inguinal area. Respiratory Normal - Lungs CTA, no rhonchi or wheezing. Cardiovascular Normal - RRR, no murmurs, gallops or rubs.    Abdomen Normal - Non-tender, non-distended, no masses, PE: The patient is asymptomatic. It is unclear why he stopped the Xarelto after his DVT, but he has restarted it and is tolerating it well. No further evidence of blood in the stool. Continue xarelto indefinitely.      Thrombocytopenia: Secondary to ch

## 2017-09-27 NOTE — PROGRESS NOTES
Education Record    Learner:  Patient    Disease / Diagnosis:    Barriers / Limitations:  None   Comments:    Method:  Discussion   Comments:    General Topics:  Medication, Side effects and symptom management and Plan of care reviewed   Comments:    Margi Matos

## 2017-10-04 NOTE — PATIENT INSTRUCTIONS
To reach Dr Mikaeal Ny nurse during business hours, please call [phone]. After hours, including weekends, evenings, and holidays, please call the main number [phone] for emergent needs.

## 2017-10-18 NOTE — PROGRESS NOTES
Education Record    Learner:  Patient    Disease / Diagnosis:    Barriers / Limitations:  None   Comments:    Method:  Discussion   Comments:    General Topics:  Medication, Side effects and symptom management and Plan of care reviewed   Comments:    Emilie Rubio

## 2017-10-18 NOTE — PROGRESS NOTES
Cancer Center Progress Note  Patient Name: Regine Gilliland   YOB: 1950   Medical Record Number: [id number]   Attending Physician: Mario Negro M.D.   [facility]  Co-Medical Director, [facility] Lisandra disease and multiple small PEs. He was restarted on Xarelto and is tolerating it well. Progression was noted and FOLFOX was restarted.  Cycle 3 was complicated by an infusion reaction and Solucortef was added to the premedications.       History of Pre Disp: 30 tablet, Rfl: 2  •  Rivaroxaban (XARELTO) 20 MG Oral Tab, Take 1 tablet (20 mg total) by mouth daily with food. , Disp: 26 tablet, Rfl: 11  •  hydrocodone-acetaminophen (NORCO) 7.5-325 MG Oral Tab, Take 1 tablet by mouth every 8 (eight) hours as nee Eyes Normal - Conjunctivae and sclerae are clear and without icterus. Pupils are reactive and equal.   Hematologic/Lymphatic Normal - No petechiae or purpura. No tender or palpable lymph nodes in the cervical, supraclavicular, axillary or inguinal area. have been tolerated well with solucortef prior to the oxaliplatin infusion. His CEA is rising, but his CT in September revealed continued response. He is also more aware of the shoulder mass, likely irritated by moving furniture. Plan to continue FOLFOX.

## 2017-11-01 NOTE — PATIENT INSTRUCTIONS
To reach Dr Irma peterson during business hours, please call [phone]. After hours, including weekends, evenings, and holidays, please call the main number [phone] for emergent needs.

## 2017-11-01 NOTE — PROGRESS NOTES
Education Record    Learner:  Patient    Disease / Diagnosis:    Barriers / Limitations:  None   Comments:    Method:  Discussion   Comments:    General Topics:  Medication, Side effects and symptom management and Plan of care reviewed   Comments:    Ulysses Cedillo

## 2017-11-01 NOTE — PROGRESS NOTES
Cancer Center Progress Note  Patient Name: Corina Beltran   YOB: 1950   Medical Record Number: [id number]   Attending Physician: Joelle Norton M.D.   [facility]  Co-Medical Director, [facility]isciplinary Lisandra restarted.  Cycle 3 was complicated by an infusion reaction and Solucortef was added to the premedications.       History of Present Illness   Romana Curling is a 78year old male with metastatic colorectal cancer, as above, who presents for follow up and Oral Tab, Take 1 tablet by mouth every 8 (eight) hours as needed. , Disp: 20 tablet, Rfl: 0  •  Prochlorperazine Maleate (COMPAZINE) 10 mg tablet, Take 1 tablet (10 mg total) by mouth every 6 (six) hours as needed for Nausea., Disp: 30 tablet, Rfl: 3  •  On nodes in the cervical, supraclavicular, axillary or inguinal area. Respiratory Normal - Lungs CTA, no rhonchi or wheezing. Cardiovascular Normal - RRR, no murmurs, gallops or rubs.    Abdomen Normal - Non-tender, non-distended, no masses, ascites or hep patient is asymptomatic. Continue xarelto indefinitely. 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 Stable. Adequate for treatment today.     Anemia: secondary to chemo: Adequate for treatment today    Epistaxis: Check platelet count with next episode to dete

## 2017-11-15 NOTE — PROGRESS NOTES
[facility] Progress Note    Patient Name: Rut Horne   YOB: 1950   Medical Record Number: [id number]   CSN: [id number]   Date of visit: 11/15/2017   Provider: VIDAL Rosales  Referring Physician: No ref.  provider found    Prob regimen.     In October 2015, he developed a LLE DVT and was started on Xarelto.     He had a delay in therapy in Early 2016 due to an extended trip to the [facility].  Prior to that trip, a mass had been noted in his shoulder.  Biopsy confirmed metastatic Prochlorperazine Maleate (COMPAZINE) 10 mg tablet, Take 1 tablet (10 mg total) by mouth every 6 (six) hours as needed for Nausea., Disp: 30 tablet, Rfl: 3  •  Ondansetron HCl (ZOFRAN) 8 MG tablet, Take 1 tablet (8 mg total) by mouth every 8 (eight) hours a Range: 150.0 - 450.0 10(3)uL 86.0 (L)   RBC Latest Ref Range: 3.80 - 5.80 x10(6)uL 3.47 (L)   MCH Latest Ref Range: 27.0 - 33.2 pg 32.0   MCHC Latest Ref Range: 31.0 - 37.0 g/dL 32.7   MCV Latest Ref Range: 80.0 - 99.0 fL 97.7   RDW Latest Ref Range: 11.5 Normal.  KIDNEYS:  Stable 1.2 cm cyst within the superior left kidney. Smaller low density foci within the cortex bilaterally are too small to actively characterize, stable. ADRENALS:  Left adrenal nodule measures 1.7 x 1.2 versus 2.1 x 1.6 cm.   AORTA/VAS scapular mass. This has been radiated in 2016. Pain is present but is tolerable to the patient who is not dependent on pain meds at this time. 3.  H/o PE: On Xarelto. No further epistaxis.   CPM.    4.  Thrombocytopenia:  Platelet count is adequate t

## 2017-11-15 NOTE — PROGRESS NOTES
Education Record    Learner:  Patient    Disease / Diagnosis:    Barriers / Limitations:  None   Comments:    Method:  Discussion   Comments:    General Topics:  Medication, Side effects and symptom management and Plan of care reviewed   Comments:    Anurag Redman

## 2017-11-21 NOTE — PROGRESS NOTES
Education Record    Learner:  Patient    Disease / Diagnosis:    Barriers / Limitations:  None   Comments:    Method:  Discussion   Comments:    General Topics:  Plan of care reviewed   Comments:    Outcome:  Shows understanding   Comments:    Plan for Opd

## 2017-11-29 NOTE — PROGRESS NOTES
Cancer Center Progress Note  Patient Name: January Claire   YOB: 1950   Medical Record Number: [id number]   Attending Physician: [facility]  Co-Medical Director, Harsha Murcia Lisandra well.    Progression was noted and FOLFOX was restarted.  Cycle 3 was complicated by an infusion reaction and Solucortef was added to the premedications.       History of Present Illness   Shawn Bolanos is a 78year old male with metastatic colorectal can food., Disp: 26 tablet, Rfl: 11  •  hydrocodone-acetaminophen (NORCO) 7.5-325 MG Oral Tab, Take 1 tablet by mouth every 8 (eight) hours as needed. , Disp: 20 tablet, Rfl: 0  •  Magnesium 100 MG Oral Tab, Take 400 mg by mouth daily. , Disp: , Rfl:     Valerie Thomas Abdomen Normal - Non-tender, non-distended, no masses, ascites or hepatosplenomegaly. Extremities Normal - No visible deformities, no cyanosis, clubbing or edema.     Integumentary Normal - No rashes, No Jaundice   Neurologic Normal - No sensory or mot 3.17   Neutrophils Absolute Latest Ref Range: 1.30 - 6.70 x10(3) uL 3.17   Lymphocytes Absolute Latest Ref Range: 0.90 - 4.00 x10(3) uL 1.17   Monocytes Absolute Latest Ref Range: 0.10 - 0.60 x10(3) uL 1.15 (H)   Eosinophils Absolute Latest Ref Range: 0.00 investigate for him and start when available. Bone metastses: Pain exacerbated with increased activity. Continue to monitor. PE: The patient is asymptomatic. Continue xarelto indefinitely. 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  No need for trans

## 2017-12-01 NOTE — TELEPHONE ENCOUNTER
Patient called wanting to know of an update with the new chemo med MD wants patient to start. Per MD, we are waiting for insurance to decline the Opdivo before he can submit the other medication. Left VM with update.

## 2017-12-18 NOTE — TELEPHONE ENCOUNTER
Opdivo approved. Instructed to come to [facility] on Wednesday for treatment, cll and MD visit.

## 2017-12-20 NOTE — PATIENT INSTRUCTIONS
Nivolumab injection  Brand Name: Rashmi Davenport  What is this medicine? NIVOLUMAB (robina VOL ue mab) is a monoclonal antibody. It is used to treat melanoma, lung cancer, kidney cancer, head and neck cancer, Hodgkin lymphoma, and urothelial cancer.   How should I u Side effects that usually do not require medical attention (report to your doctor or health care professional if they continue or are bothersome):  · bone pain  · constipation  · tiredness  · vomiting  What may interact with this medicine?   Interactions ha NOTE:This sheet is a summary. It may not cover all possible information. If you have questions about this medicine, talk to your doctor, pharmacist, or health care provider.  Copyright© 2017 [employer]vier

## 2017-12-20 NOTE — PROGRESS NOTES
Cancer Center Progress Note  Patient Name: Ronald Rogers   YOB: 1950   Medical Record Number: [id number]   Attending Physician: [facility]  Co-Medical Director, Margaret Murcia Lisandra restarted. Cycle 3 was complicated by an infusion reaction and Solucortef was added to the premedications.       History of Present Illness   Benigno Carrillo is a 78year old male with metastatic colorectal cancer, as above, who presents for follow up.   He MOUTH EVERY DAY, Disp: 60 tablet, Rfl: 2  •  Rivaroxaban (XARELTO) 20 MG Oral Tab, Take 1 tablet (20 mg total) by mouth daily with food. , Disp: 26 tablet, Rfl: 11  •  Magnesium 100 MG Oral Tab, Take 400 mg by mouth daily. , Disp: , Rfl:     Allergies:     Ox Extremities No cyanosis, clubbing or edema. Large palpable mass of the L scapula. Integumentary Normal - No rashes and noJaundice   Neurologic Normal - No sensory or motor deficits, normal cerebellar function, normal gait, cranial nerves intact.    Psyc risks, benefits, and side effects, and he agreed. Begin cycle 1 today. Bone metastses: Worsened by exam.  Increase Norco use to 1-2 tabs PO q4-6hr prn. PE: The patient is asymptomatic. Continue xarelto indefinitely.      Anemia: secondary to chemo and

## 2017-12-21 NOTE — TELEPHONE ENCOUNTER
Date of Treatment: 12/20/17                                Type of Chemo: Opdivo    Comments: Pt denies any side effects or questions relating to treatment. He received refill for Pain meds however, he needs to wait to refill til Tuesday for SS check.  Pt e

## 2018-01-01 ENCOUNTER — SOCIAL WORK SERVICES (OUTPATIENT)
Dept: HEMATOLOGY/ONCOLOGY | Facility: HOSPITAL | Age: 68
End: 2018-01-01

## 2018-01-01 ENCOUNTER — HOSPITAL ENCOUNTER (INPATIENT)
Facility: HOSPITAL | Age: 68
LOS: 6 days | Discharge: NURSING, REHAB OR LONG-TERM CARE | DRG: 054 | End: 2018-01-01
Attending: EMERGENCY MEDICINE | Admitting: HOSPITALIST
Payer: MEDICARE

## 2018-01-01 ENCOUNTER — APPOINTMENT (OUTPATIENT)
Dept: GENERAL RADIOLOGY | Facility: HOSPITAL | Age: 68
DRG: 054 | End: 2018-01-01
Attending: EMERGENCY MEDICINE
Payer: MEDICARE

## 2018-01-01 ENCOUNTER — OFFICE VISIT (OUTPATIENT)
Dept: HEMATOLOGY/ONCOLOGY | Age: 68
End: 2018-01-01
Attending: INTERNAL MEDICINE
Payer: MEDICARE

## 2018-01-01 ENCOUNTER — HOSPITAL ENCOUNTER (OUTPATIENT)
Dept: CT IMAGING | Age: 68
Discharge: HOME | End: 2018-01-01
Attending: INTERNAL MEDICINE
Payer: MEDICARE

## 2018-01-01 ENCOUNTER — NURSE ONLY (OUTPATIENT)
Dept: LAB | Age: 68
End: 2018-01-01
Attending: FAMILY MEDICINE
Payer: MEDICARE

## 2018-01-01 ENCOUNTER — TELEPHONE (OUTPATIENT)
Dept: HEMATOLOGY/ONCOLOGY | Facility: HOSPITAL | Age: 68
End: 2018-01-01

## 2018-01-01 ENCOUNTER — HOSPITAL ENCOUNTER (OUTPATIENT)
Dept: RADIATION ONCOLOGY | Facility: HOSPITAL | Age: 68
Discharge: HOME | End: 2018-01-01
Attending: RADIOLOGY
Payer: MEDICARE

## 2018-01-01 ENCOUNTER — APPOINTMENT (OUTPATIENT)
Dept: CT IMAGING | Facility: HOSPITAL | Age: 68
DRG: 054 | End: 2018-01-01
Attending: INTERNAL MEDICINE
Payer: MEDICARE

## 2018-01-01 ENCOUNTER — HOSPITAL ENCOUNTER (INPATIENT)
Facility: HOSPITAL | Age: 68
LOS: 1 days | DRG: 189 | End: 2018-01-01
Attending: EMERGENCY MEDICINE | Admitting: HOSPITALIST
Payer: MEDICARE

## 2018-01-01 ENCOUNTER — SNF/IP PROF CHARGE ONLY (OUTPATIENT)
Dept: HEMATOLOGY/ONCOLOGY | Facility: HOSPITAL | Age: 68
End: 2018-01-01

## 2018-01-01 ENCOUNTER — SNF VISIT (OUTPATIENT)
Dept: INTERNAL MEDICINE CLINIC | Age: 68
End: 2018-01-01

## 2018-01-01 ENCOUNTER — TELEPHONE (OUTPATIENT)
Dept: RADIATION ONCOLOGY | Age: 68
End: 2018-01-01

## 2018-01-01 ENCOUNTER — APPOINTMENT (OUTPATIENT)
Dept: HEMATOLOGY/ONCOLOGY | Age: 68
End: 2018-01-01
Attending: INTERNAL MEDICINE
Payer: MEDICARE

## 2018-01-01 ENCOUNTER — HOSPITAL ENCOUNTER (OUTPATIENT)
Dept: RADIATION ONCOLOGY | Facility: HOSPITAL | Age: 68
End: 2018-01-01
Attending: RADIOLOGY
Payer: MEDICARE

## 2018-01-01 ENCOUNTER — APPOINTMENT (OUTPATIENT)
Dept: MRI IMAGING | Facility: HOSPITAL | Age: 68
DRG: 054 | End: 2018-01-01
Attending: EMERGENCY MEDICINE
Payer: MEDICARE

## 2018-01-01 ENCOUNTER — SNF ADMIT/H&P (OUTPATIENT)
Dept: FAMILY MEDICINE CLINIC | Facility: CLINIC | Age: 68
End: 2018-01-01

## 2018-01-01 ENCOUNTER — DOCUMENTATION ONLY (OUTPATIENT)
Dept: RADIATION ONCOLOGY | Facility: HOSPITAL | Age: 68
End: 2018-01-01

## 2018-01-01 ENCOUNTER — APPOINTMENT (OUTPATIENT)
Dept: HEMATOLOGY/ONCOLOGY | Facility: HOSPITAL | Age: 68
End: 2018-01-01
Attending: INTERNAL MEDICINE
Payer: MEDICARE

## 2018-01-01 ENCOUNTER — APPOINTMENT (OUTPATIENT)
Dept: CT IMAGING | Facility: HOSPITAL | Age: 68
DRG: 054 | End: 2018-01-01
Attending: EMERGENCY MEDICINE
Payer: MEDICARE

## 2018-01-01 ENCOUNTER — NURSE ONLY (OUTPATIENT)
Dept: HEMATOLOGY/ONCOLOGY | Age: 68
End: 2018-01-01
Attending: INTERNAL MEDICINE
Payer: MEDICARE

## 2018-01-01 ENCOUNTER — TELEPHONE (OUTPATIENT)
Dept: RADIATION ONCOLOGY | Facility: HOSPITAL | Age: 68
End: 2018-01-01

## 2018-01-01 VITALS
TEMPERATURE: 100 F | OXYGEN SATURATION: 100 % | HEART RATE: 106 BPM | WEIGHT: 161.19 LBS | SYSTOLIC BLOOD PRESSURE: 116 MMHG | BODY MASS INDEX: 24 KG/M2 | DIASTOLIC BLOOD PRESSURE: 75 MMHG | RESPIRATION RATE: 18 BRPM

## 2018-01-01 VITALS
HEART RATE: 97 BPM | TEMPERATURE: 98 F | DIASTOLIC BLOOD PRESSURE: 58 MMHG | RESPIRATION RATE: 18 BRPM | SYSTOLIC BLOOD PRESSURE: 129 MMHG | OXYGEN SATURATION: 93 %

## 2018-01-01 VITALS
HEART RATE: 96 BPM | HEIGHT: 71 IN | WEIGHT: 153.69 LBS | SYSTOLIC BLOOD PRESSURE: 45 MMHG | OXYGEN SATURATION: 74 % | RESPIRATION RATE: 16 BRPM | DIASTOLIC BLOOD PRESSURE: 30 MMHG | TEMPERATURE: 98 F | BODY MASS INDEX: 21.52 KG/M2

## 2018-01-01 VITALS
SYSTOLIC BLOOD PRESSURE: 105 MMHG | WEIGHT: 161 LBS | BODY MASS INDEX: 24 KG/M2 | TEMPERATURE: 98 F | DIASTOLIC BLOOD PRESSURE: 70 MMHG | HEART RATE: 97 BPM | RESPIRATION RATE: 16 BRPM | OXYGEN SATURATION: 98 %

## 2018-01-01 VITALS
WEIGHT: 162.13 LBS | RESPIRATION RATE: 16 BRPM | DIASTOLIC BLOOD PRESSURE: 80 MMHG | HEART RATE: 91 BPM | SYSTOLIC BLOOD PRESSURE: 123 MMHG | BODY MASS INDEX: 23.74 KG/M2 | TEMPERATURE: 98 F | OXYGEN SATURATION: 98 % | HEIGHT: 69.29 IN

## 2018-01-01 VITALS
SYSTOLIC BLOOD PRESSURE: 135 MMHG | BODY MASS INDEX: 27 KG/M2 | HEART RATE: 82 BPM | RESPIRATION RATE: 18 BRPM | WEIGHT: 182.63 LBS | DIASTOLIC BLOOD PRESSURE: 81 MMHG | OXYGEN SATURATION: 98 %

## 2018-01-01 VITALS
SYSTOLIC BLOOD PRESSURE: 132 MMHG | HEART RATE: 78 BPM | WEIGHT: 178.13 LBS | DIASTOLIC BLOOD PRESSURE: 76 MMHG | HEIGHT: 69 IN | RESPIRATION RATE: 20 BRPM | TEMPERATURE: 97 F | BODY MASS INDEX: 26.38 KG/M2 | OXYGEN SATURATION: 99 %

## 2018-01-01 VITALS
RESPIRATION RATE: 18 BRPM | OXYGEN SATURATION: 99 % | BODY MASS INDEX: 24 KG/M2 | SYSTOLIC BLOOD PRESSURE: 141 MMHG | DIASTOLIC BLOOD PRESSURE: 77 MMHG | TEMPERATURE: 98 F | WEIGHT: 166.88 LBS | HEART RATE: 90 BPM

## 2018-01-01 VITALS
TEMPERATURE: 97 F | DIASTOLIC BLOOD PRESSURE: 75 MMHG | WEIGHT: 161.19 LBS | HEART RATE: 108 BPM | OXYGEN SATURATION: 96 % | BODY MASS INDEX: 24 KG/M2 | SYSTOLIC BLOOD PRESSURE: 112 MMHG | RESPIRATION RATE: 16 BRPM

## 2018-01-01 VITALS
DIASTOLIC BLOOD PRESSURE: 72 MMHG | SYSTOLIC BLOOD PRESSURE: 114 MMHG | RESPIRATION RATE: 18 BRPM | OXYGEN SATURATION: 98 % | BODY MASS INDEX: 25 KG/M2 | WEIGHT: 173.81 LBS | TEMPERATURE: 99 F

## 2018-01-01 VITALS
SYSTOLIC BLOOD PRESSURE: 125 MMHG | RESPIRATION RATE: 18 BRPM | OXYGEN SATURATION: 96 % | DIASTOLIC BLOOD PRESSURE: 72 MMHG | HEART RATE: 98 BPM

## 2018-01-01 VITALS
DIASTOLIC BLOOD PRESSURE: 78 MMHG | HEART RATE: 103 BPM | RESPIRATION RATE: 18 BRPM | SYSTOLIC BLOOD PRESSURE: 148 MMHG | OXYGEN SATURATION: 98 %

## 2018-01-01 VITALS
RESPIRATION RATE: 18 BRPM | TEMPERATURE: 98 F | HEART RATE: 97 BPM | SYSTOLIC BLOOD PRESSURE: 106 MMHG | DIASTOLIC BLOOD PRESSURE: 76 MMHG | OXYGEN SATURATION: 97 %

## 2018-01-01 VITALS
HEART RATE: 80 BPM | TEMPERATURE: 98 F | SYSTOLIC BLOOD PRESSURE: 135 MMHG | RESPIRATION RATE: 20 BRPM | DIASTOLIC BLOOD PRESSURE: 72 MMHG | OXYGEN SATURATION: 96 %

## 2018-01-01 VITALS
WEIGHT: 172.38 LBS | BODY MASS INDEX: 25 KG/M2 | RESPIRATION RATE: 18 BRPM | TEMPERATURE: 97 F | SYSTOLIC BLOOD PRESSURE: 129 MMHG | OXYGEN SATURATION: 99 % | DIASTOLIC BLOOD PRESSURE: 77 MMHG | HEART RATE: 114 BPM

## 2018-01-01 VITALS
SYSTOLIC BLOOD PRESSURE: 127 MMHG | OXYGEN SATURATION: 97 % | RESPIRATION RATE: 19 BRPM | DIASTOLIC BLOOD PRESSURE: 79 MMHG | HEART RATE: 100 BPM

## 2018-01-01 VITALS
RESPIRATION RATE: 18 BRPM | OXYGEN SATURATION: 98 % | SYSTOLIC BLOOD PRESSURE: 98 MMHG | TEMPERATURE: 98 F | WEIGHT: 172 LBS | DIASTOLIC BLOOD PRESSURE: 68 MMHG | BODY MASS INDEX: 25 KG/M2 | HEART RATE: 96 BPM

## 2018-01-01 VITALS
DIASTOLIC BLOOD PRESSURE: 79 MMHG | RESPIRATION RATE: 18 BRPM | SYSTOLIC BLOOD PRESSURE: 141 MMHG | HEART RATE: 107 BPM | OXYGEN SATURATION: 96 %

## 2018-01-01 DIAGNOSIS — C18.8 OVERLAPPING MALIGNANT NEOPLASM OF COLON (HCC): ICD-10-CM

## 2018-01-01 DIAGNOSIS — G47.01 INSOMNIA DUE TO MEDICAL CONDITION: ICD-10-CM

## 2018-01-01 DIAGNOSIS — C18.9 MALIGNANT NEOPLASM OF COLON, UNSPECIFIED PART OF COLON (HCC): Primary | ICD-10-CM

## 2018-01-01 DIAGNOSIS — I61.0 NONTRAUMATIC SUBCORTICAL HEMORRHAGE OF CEREBRAL HEMISPHERE, UNSPECIFIED LATERALITY (HCC): ICD-10-CM

## 2018-01-01 DIAGNOSIS — C79.51 BONE METASTASES (HCC): ICD-10-CM

## 2018-01-01 DIAGNOSIS — R55 SYNCOPE AND COLLAPSE: Primary | ICD-10-CM

## 2018-01-01 DIAGNOSIS — Z51.5 COMFORT MEASURES ONLY STATUS: Primary | ICD-10-CM

## 2018-01-01 DIAGNOSIS — C79.31 BRAIN METASTASES (HCC): ICD-10-CM

## 2018-01-01 DIAGNOSIS — N30.00 ACUTE CYSTITIS WITHOUT HEMATURIA: ICD-10-CM

## 2018-01-01 DIAGNOSIS — R79.89 ELEVATED LFTS: ICD-10-CM

## 2018-01-01 DIAGNOSIS — M25.512 CHRONIC LEFT SHOULDER PAIN: ICD-10-CM

## 2018-01-01 DIAGNOSIS — C78.7 LIVER METASTASES (HCC): ICD-10-CM

## 2018-01-01 DIAGNOSIS — C18.9 COLON CANCER METASTASIZED TO LIVER (HCC): ICD-10-CM

## 2018-01-01 DIAGNOSIS — C80.1 CANCER (HCC): Primary | ICD-10-CM

## 2018-01-01 DIAGNOSIS — C78.7 COLON CANCER METASTASIZED TO LIVER (HCC): ICD-10-CM

## 2018-01-01 DIAGNOSIS — C18.9 MALIGNANT NEOPLASM OF COLON, UNSPECIFIED PART OF COLON (HCC): ICD-10-CM

## 2018-01-01 DIAGNOSIS — C19 CANCER OF RECTOSIGMOID (COLON) (HCC): ICD-10-CM

## 2018-01-01 DIAGNOSIS — C18.8 CANCER OF OVERLAPPING SITES OF COLON (HCC): Primary | ICD-10-CM

## 2018-01-01 DIAGNOSIS — R60.9 EDEMA: ICD-10-CM

## 2018-01-01 DIAGNOSIS — C80.1 CANCER (HCC): ICD-10-CM

## 2018-01-01 DIAGNOSIS — C79.51 BONE METASTASES (HCC): Primary | ICD-10-CM

## 2018-01-01 DIAGNOSIS — C78.7 COLON CANCER METASTASIZED TO LIVER (HCC): Primary | ICD-10-CM

## 2018-01-01 DIAGNOSIS — G89.29 CHRONIC LEFT SHOULDER PAIN: ICD-10-CM

## 2018-01-01 DIAGNOSIS — R53.1 WEAKNESS: ICD-10-CM

## 2018-01-01 DIAGNOSIS — M62.81 MUSCLE WEAKNESS (GENERALIZED): ICD-10-CM

## 2018-01-01 DIAGNOSIS — C78.7 METASTATIC COLON CANCER TO LIVER (HCC): ICD-10-CM
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
## 2018-01-01 DIAGNOSIS — B37.0 THRUSH: ICD-10-CM

## 2018-01-01 DIAGNOSIS — D63.0 ANEMIA COMPLICATING NEOPLASTIC DISEASE: ICD-10-CM

## 2018-01-01 DIAGNOSIS — N39.0 LOWER URINARY TRACT INFECTIOUS DISEASE: ICD-10-CM

## 2018-01-01 DIAGNOSIS — I10 HTN, GOAL BELOW 130/80: ICD-10-CM

## 2018-01-01 DIAGNOSIS — F41.8 ANXIETY ABOUT HEALTH: ICD-10-CM

## 2018-01-01 DIAGNOSIS — C78.7 LIVER METASTASES (HCC): Primary | ICD-10-CM

## 2018-01-01 DIAGNOSIS — I27.82 OTHER CHRONIC PULMONARY EMBOLISM WITHOUT ACUTE COR PULMONALE (HCC): ICD-10-CM

## 2018-01-01 DIAGNOSIS — Z71.89 GOALS OF CARE, COUNSELING/DISCUSSION: ICD-10-CM

## 2018-01-01 DIAGNOSIS — C18.9 METASTATIC COLON CANCER TO LIVER (HCC): ICD-10-CM

## 2018-01-01 DIAGNOSIS — C18.9 COLON CANCER METASTASIZED TO LIVER (HCC): Primary | ICD-10-CM

## 2018-01-01 DIAGNOSIS — G89.3 NEOPLASM RELATED PAIN: Primary | ICD-10-CM

## 2018-01-01 DIAGNOSIS — R69 DIAGNOSIS UNKNOWN: Primary | ICD-10-CM

## 2018-01-01 DIAGNOSIS — D72.829 LEUKOCYTOSIS, UNSPECIFIED TYPE: ICD-10-CM

## 2018-01-01 DIAGNOSIS — C18.8 OVERLAPPING MALIGNANT NEOPLASM OF COLON (HCC): Primary | ICD-10-CM
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
## 2018-01-01 DIAGNOSIS — C18.9 COLON CANCER (HCC): Primary | ICD-10-CM

## 2018-01-01 DIAGNOSIS — Z51.5 PALLIATIVE CARE ENCOUNTER: ICD-10-CM

## 2018-01-01 DIAGNOSIS — I10 HTN (HYPERTENSION): Primary | ICD-10-CM

## 2018-01-01 DIAGNOSIS — C18.9 COLON CANCER (HCC): ICD-10-CM

## 2018-01-01 DIAGNOSIS — I82.A21 CHRONIC DEEP VEIN THROMBOSIS (DVT) OF AXILLARY VEIN OF RIGHT UPPER EXTREMITY (HCC): ICD-10-CM

## 2018-01-01 LAB
ALBUMIN SERPL-MCNC: 1.9 G/DL (ref 3.5–4.8)
ALBUMIN SERPL-MCNC: 2 G/DL (ref 3.5–4.8)
ALBUMIN SERPL-MCNC: 2.1 G/DL (ref 3.5–4.8)
ALBUMIN SERPL-MCNC: 2.1 G/DL (ref 3.5–4.8)
ALBUMIN SERPL-MCNC: 2.3 G/DL (ref 3.5–4.8)
ALBUMIN SERPL-MCNC: 2.3 G/DL (ref 3.5–4.8)
ALBUMIN SERPL-MCNC: 2.4 G/DL (ref 3.5–4.8)
ALBUMIN SERPL-MCNC: 2.5 G/DL (ref 3.5–4.8)
ALBUMIN SERPL-MCNC: 2.6 G/DL (ref 3.5–4.8)
ALBUMIN SERPL-MCNC: 2.8 G/DL (ref 3.5–4.8)
ALBUMIN SERPL-MCNC: 2.8 G/DL (ref 3.5–4.8)
ALP LIVER SERPL-CCNC: 324 U/L (ref 45–117)
ALP LIVER SERPL-CCNC: 438 U/L (ref 45–117)
ALP LIVER SERPL-CCNC: 543 U/L (ref 45–117)
ALP LIVER SERPL-CCNC: 622 U/L (ref 45–117)
ALP LIVER SERPL-CCNC: 649 U/L (ref 45–117)
ALP LIVER SERPL-CCNC: 765 U/L (ref 45–117)
ALP LIVER SERPL-CCNC: 772 U/L (ref 45–117)
ALP LIVER SERPL-CCNC: 821 U/L (ref 45–117)
ALP LIVER SERPL-CCNC: 842 U/L (ref 45–117)
ALP LIVER SERPL-CCNC: 917 U/L (ref 45–117)
ALP LIVER SERPL-CCNC: 958 U/L (ref 45–117)
ALT SERPL-CCNC: 28 U/L (ref 17–63)
ALT SERPL-CCNC: 33 U/L (ref 17–63)
ALT SERPL-CCNC: 38 U/L (ref 17–63)
ALT SERPL-CCNC: 40 U/L (ref 17–63)
ALT SERPL-CCNC: 47 U/L (ref 17–63)
ALT SERPL-CCNC: 49 U/L (ref 17–63)
ALT SERPL-CCNC: 54 U/L (ref 17–63)
ALT SERPL-CCNC: 62 U/L (ref 17–63)
ALT SERPL-CCNC: 76 U/L (ref 17–63)
AMMONIA: 25 UMOL/L (ref 11–32)
APTT PPP: 32.9 SECONDS (ref 25–34)
APTT PPP: 34 SECONDS (ref 25–34)
APTT PPP: 37.8 SECONDS (ref 25–34)
APTT PPP: 38.8 SECONDS (ref 25–34)
AST SERPL-CCNC: 102 U/L (ref 15–41)
AST SERPL-CCNC: 119 U/L (ref 15–41)
AST SERPL-CCNC: 122 U/L (ref 15–41)
AST SERPL-CCNC: 128 U/L (ref 15–41)
AST SERPL-CCNC: 138 U/L (ref 15–41)
AST SERPL-CCNC: 150 U/L (ref 15–41)
AST SERPL-CCNC: 173 U/L (ref 15–41)
AST SERPL-CCNC: 63 U/L (ref 15–41)
AST SERPL-CCNC: 70 U/L (ref 15–41)
AST SERPL-CCNC: 73 U/L (ref 15–41)
AST SERPL-CCNC: 81 U/L (ref 15–41)
ATRIAL RATE: 91 BPM
BASOPHILS # BLD AUTO: 0.01 X10(3) UL (ref 0–0.1)
BASOPHILS # BLD AUTO: 0.02 X10(3) UL (ref 0–0.1)
BASOPHILS # BLD AUTO: 0.03 X10(3) UL (ref 0–0.1)
BASOPHILS # BLD AUTO: 0.04 X10(3) UL (ref 0–0.1)
BASOPHILS # BLD AUTO: 0.04 X10(3) UL (ref 0–0.1)
BASOPHILS NFR BLD AUTO: 0.1 %
BASOPHILS NFR BLD AUTO: 0.2 %
BASOPHILS NFR BLD AUTO: 0.3 %
BASOPHILS NFR BLD AUTO: 0.3 %
BASOPHILS NFR BLD AUTO: 0.5 %
BILIRUB DIRECT SERPL-MCNC: 2.5 MG/DL (ref 0.1–0.5)
BILIRUB DIRECT SERPL-MCNC: 2.8 MG/DL (ref 0.1–0.5)
BILIRUB SERPL-MCNC: 0.4 MG/DL (ref 0.1–2)
BILIRUB SERPL-MCNC: 0.5 MG/DL (ref 0.1–2)
BILIRUB SERPL-MCNC: 0.6 MG/DL (ref 0.1–2)
BILIRUB SERPL-MCNC: 0.8 MG/DL (ref 0.1–2)
BILIRUB SERPL-MCNC: 1.8 MG/DL (ref 0.1–2)
BILIRUB SERPL-MCNC: 3.1 MG/DL (ref 0.1–2)
BILIRUB SERPL-MCNC: 3.3 MG/DL (ref 0.1–2)
BILIRUB SERPL-MCNC: 3.4 MG/DL (ref 0.1–2)
BILIRUB SERPL-MCNC: 3.5 MG/DL (ref 0.1–2)
BILIRUB SERPL-MCNC: 3.7 MG/DL (ref 0.1–2)
BILIRUB SERPL-MCNC: 4.3 MG/DL (ref 0.1–2)
BILIRUB UR QL STRIP.AUTO: NEGATIVE
BUN BLD-MCNC: 10 MG/DL (ref 8–20)
BUN BLD-MCNC: 11 MG/DL (ref 8–20)
BUN BLD-MCNC: 12 MG/DL (ref 8–20)
BUN BLD-MCNC: 14 MG/DL (ref 8–20)
BUN BLD-MCNC: 14 MG/DL (ref 8–20)
BUN BLD-MCNC: 18 MG/DL (ref 8–20)
BUN BLD-MCNC: 20 MG/DL (ref 8–20)
BUN BLD-MCNC: 21 MG/DL (ref 8–20)
BUN BLD-MCNC: 6 MG/DL (ref 8–20)
BUN BLD-MCNC: 9 MG/DL (ref 8–20)
CALCIUM BLD-MCNC: 7.9 MG/DL (ref 8.3–10.3)
CALCIUM BLD-MCNC: 7.9 MG/DL (ref 8.3–10.3)
CALCIUM BLD-MCNC: 8.2 MG/DL (ref 8.3–10.3)
CALCIUM BLD-MCNC: 8.5 MG/DL (ref 8.3–10.3)
CALCIUM BLD-MCNC: 8.6 MG/DL (ref 8.3–10.3)
CALCIUM BLD-MCNC: 8.6 MG/DL (ref 8.3–10.3)
CALCIUM BLD-MCNC: 8.9 MG/DL (ref 8.3–10.3)
CALCIUM BLD-MCNC: 9 MG/DL (ref 8.3–10.3)
CEA: 6239.3 NG/ML (ref 0.5–5)
CEA: 6853.2 NG/ML (ref 0.5–5)
CEA: 7165.5 NG/ML (ref 0.5–5)
CHLORIDE: 100 MMOL/L (ref 101–111)
CHLORIDE: 101 MMOL/L (ref 101–111)
CHLORIDE: 102 MMOL/L (ref 101–111)
CHLORIDE: 102 MMOL/L (ref 101–111)
CHLORIDE: 103 MMOL/L (ref 101–111)
CHLORIDE: 106 MMOL/L (ref 101–111)
CHLORIDE: 106 MMOL/L (ref 101–111)
CHLORIDE: 108 MMOL/L (ref 101–111)
CHLORIDE: 109 MMOL/L (ref 101–111)
CHLORIDE: 111 MMOL/L (ref 101–111)
CHOLEST SMN-MCNC: 217 MG/DL (ref ?–200)
CLARITY UR REFRACT.AUTO: CLEAR
CO2: 21 MMOL/L (ref 22–32)
CO2: 23 MMOL/L (ref 22–32)
CO2: 24 MMOL/L (ref 22–32)
CO2: 24 MMOL/L (ref 22–32)
CO2: 26 MMOL/L (ref 22–32)
CO2: 27 MMOL/L (ref 22–32)
CO2: 29 MMOL/L (ref 22–32)
CO2: 30 MMOL/L (ref 22–32)
COLOR UR AUTO: YELLOW
CREAT BLD-MCNC: 0.43 MG/DL (ref 0.7–1.3)
CREAT BLD-MCNC: 0.45 MG/DL (ref 0.7–1.3)
CREAT BLD-MCNC: 0.5 MG/DL (ref 0.7–1.3)
CREAT BLD-MCNC: 0.53 MG/DL (ref 0.7–1.3)
CREAT BLD-MCNC: 0.53 MG/DL (ref 0.7–1.3)
CREAT BLD-MCNC: 0.54 MG/DL (ref 0.7–1.3)
CREAT BLD-MCNC: 0.56 MG/DL (ref 0.7–1.3)
CREAT BLD-MCNC: 0.57 MG/DL (ref 0.7–1.3)
CREAT BLD-MCNC: 0.64 MG/DL (ref 0.7–1.3)
CREAT BLD-MCNC: 0.68 MG/DL (ref 0.7–1.3)
EOSINOPHIL # BLD AUTO: 0 X10(3) UL (ref 0–0.3)
EOSINOPHIL # BLD AUTO: 0.06 X10(3) UL (ref 0–0.3)
EOSINOPHIL # BLD AUTO: 0.06 X10(3) UL (ref 0–0.3)
EOSINOPHIL # BLD AUTO: 0.09 X10(3) UL (ref 0–0.3)
EOSINOPHIL # BLD AUTO: 0.1 X10(3) UL (ref 0–0.3)
EOSINOPHIL # BLD AUTO: 0.17 X10(3) UL (ref 0–0.3)
EOSINOPHIL # BLD AUTO: 0.21 X10(3) UL (ref 0–0.3)
EOSINOPHIL # BLD AUTO: 0.24 X10(3) UL (ref 0–0.3)
EOSINOPHIL NFR BLD AUTO: 0 %
EOSINOPHIL NFR BLD AUTO: 0.5 %
EOSINOPHIL NFR BLD AUTO: 0.8 %
EOSINOPHIL NFR BLD AUTO: 1.8 %
EOSINOPHIL NFR BLD AUTO: 2.4 %
EOSINOPHIL NFR BLD AUTO: 2.7 %
ERYTHROCYTE [DISTWIDTH] IN BLOOD BY AUTOMATED COUNT: 14.6 % (ref 11.5–16)
ERYTHROCYTE [DISTWIDTH] IN BLOOD BY AUTOMATED COUNT: 15.2 % (ref 11.5–16)
ERYTHROCYTE [DISTWIDTH] IN BLOOD BY AUTOMATED COUNT: 15.3 % (ref 11.5–16)
ERYTHROCYTE [DISTWIDTH] IN BLOOD BY AUTOMATED COUNT: 15.8 % (ref 11.5–16)
ERYTHROCYTE [DISTWIDTH] IN BLOOD BY AUTOMATED COUNT: 17 % (ref 11.5–16)
ERYTHROCYTE [DISTWIDTH] IN BLOOD BY AUTOMATED COUNT: 19.3 % (ref 11.5–16)
ERYTHROCYTE [DISTWIDTH] IN BLOOD BY AUTOMATED COUNT: 19.3 % (ref 11.5–16)
ERYTHROCYTE [DISTWIDTH] IN BLOOD BY AUTOMATED COUNT: 19.9 % (ref 11.5–16)
ERYTHROCYTE [DISTWIDTH] IN BLOOD BY AUTOMATED COUNT: 20.2 % (ref 11.5–16)
ERYTHROCYTE [DISTWIDTH] IN BLOOD BY AUTOMATED COUNT: 20.7 % (ref 11.5–16)
ERYTHROCYTE [DISTWIDTH] IN BLOOD BY AUTOMATED COUNT: 21.4 % (ref 11.5–16)
EST. AVERAGE GLUCOSE BLD GHB EST-MCNC: 100 MG/DL (ref 68–126)
GLUCOSE BLD-MCNC: 123 MG/DL (ref 65–99)
GLUCOSE BLD-MCNC: 127 MG/DL (ref 65–99)
GLUCOSE BLD-MCNC: 128 MG/DL (ref 70–99)
GLUCOSE BLD-MCNC: 128 MG/DL (ref 70–99)
GLUCOSE BLD-MCNC: 133 MG/DL (ref 65–99)
GLUCOSE BLD-MCNC: 140 MG/DL (ref 70–99)
GLUCOSE BLD-MCNC: 141 MG/DL (ref 70–99)
GLUCOSE BLD-MCNC: 143 MG/DL (ref 65–99)
GLUCOSE BLD-MCNC: 143 MG/DL (ref 70–99)
GLUCOSE BLD-MCNC: 145 MG/DL (ref 70–99)
GLUCOSE BLD-MCNC: 145 MG/DL (ref 70–99)
GLUCOSE BLD-MCNC: 148 MG/DL (ref 70–99)
GLUCOSE BLD-MCNC: 149 MG/DL (ref 65–99)
GLUCOSE BLD-MCNC: 151 MG/DL (ref 65–99)
GLUCOSE BLD-MCNC: 152 MG/DL (ref 70–99)
GLUCOSE BLD-MCNC: 155 MG/DL (ref 65–99)
GLUCOSE BLD-MCNC: 160 MG/DL (ref 65–99)
GLUCOSE BLD-MCNC: 161 MG/DL (ref 65–99)
GLUCOSE BLD-MCNC: 169 MG/DL (ref 65–99)
GLUCOSE BLD-MCNC: 176 MG/DL (ref 65–99)
GLUCOSE BLD-MCNC: 176 MG/DL (ref 65–99)
GLUCOSE BLD-MCNC: 183 MG/DL (ref 65–99)
GLUCOSE BLD-MCNC: 184 MG/DL (ref 65–99)
GLUCOSE BLD-MCNC: 185 MG/DL (ref 65–99)
GLUCOSE BLD-MCNC: 185 MG/DL (ref 65–99)
GLUCOSE BLD-MCNC: 190 MG/DL (ref 65–99)
GLUCOSE BLD-MCNC: 192 MG/DL (ref 65–99)
GLUCOSE BLD-MCNC: 211 MG/DL (ref 65–99)
GLUCOSE BLD-MCNC: 220 MG/DL (ref 65–99)
GLUCOSE BLD-MCNC: 231 MG/DL (ref 65–99)
GLUCOSE BLD-MCNC: 82 MG/DL (ref 70–99)
GLUCOSE BLD-MCNC: 84 MG/DL (ref 70–99)
GLUCOSE BLD-MCNC: 90 MG/DL (ref 70–99)
GLUCOSE UR STRIP.AUTO-MCNC: NEGATIVE MG/DL
HAV IGM SER QL: 1.8 MG/DL (ref 1.7–3)
HAV IGM SER QL: 2.1 MG/DL (ref 1.7–3)
HAV IGM SER QL: 2.1 MG/DL (ref 1.7–3)
HAV IGM SER QL: 2.2 MG/DL (ref 1.7–3)
HBA1C MFR BLD HPLC: 5.1 % (ref ?–5.7)
HCT VFR BLD AUTO: 23.2 % (ref 37–53)
HCT VFR BLD AUTO: 24 % (ref 37–53)
HCT VFR BLD AUTO: 24.5 % (ref 37–53)
HCT VFR BLD AUTO: 26 % (ref 37–53)
HCT VFR BLD AUTO: 27.1 % (ref 37–53)
HCT VFR BLD AUTO: 27.1 % (ref 37–53)
HCT VFR BLD AUTO: 27.5 % (ref 37–53)
HCT VFR BLD AUTO: 28.6 % (ref 37–53)
HCT VFR BLD AUTO: 30.4 % (ref 37–53)
HCT VFR BLD AUTO: 31.8 % (ref 37–53)
HCT VFR BLD AUTO: 32 % (ref 37–53)
HDLC SERPL-MCNC: 11 MG/DL (ref 45–?)
HDLC SERPL: 19.73 {RATIO} (ref ?–4.97)
HGB BLD-MCNC: 10.1 G/DL (ref 13–17)
HGB BLD-MCNC: 10.2 G/DL (ref 13–17)
HGB BLD-MCNC: 10.3 G/DL (ref 13–17)
HGB BLD-MCNC: 8.1 G/DL (ref 13–17)
HGB BLD-MCNC: 8.1 G/DL (ref 13–17)
HGB BLD-MCNC: 8.2 G/DL (ref 13–17)
HGB BLD-MCNC: 8.8 G/DL (ref 13–17)
HGB BLD-MCNC: 9 G/DL (ref 13–17)
HGB BLD-MCNC: 9.1 G/DL (ref 13–17)
HGB BLD-MCNC: 9.4 G/DL (ref 13–17)
HGB BLD-MCNC: 9.7 G/DL (ref 13–17)
IMMATURE GRANULOCYTE COUNT: 0.02 X10(3) UL (ref 0–1)
IMMATURE GRANULOCYTE COUNT: 0.03 X10(3) UL (ref 0–1)
IMMATURE GRANULOCYTE COUNT: 0.04 X10(3) UL (ref 0–1)
IMMATURE GRANULOCYTE COUNT: 0.05 X10(3) UL (ref 0–1)
IMMATURE GRANULOCYTE COUNT: 0.06 X10(3) UL (ref 0–1)
IMMATURE GRANULOCYTE COUNT: 0.08 X10(3) UL (ref 0–1)
IMMATURE GRANULOCYTE COUNT: 0.13 X10(3) UL (ref 0–1)
IMMATURE GRANULOCYTE COUNT: 0.14 X10(3) UL (ref 0–1)
IMMATURE GRANULOCYTE COUNT: 0.14 X10(3) UL (ref 0–1)
IMMATURE GRANULOCYTE RATIO %: 0.2 %
IMMATURE GRANULOCYTE RATIO %: 0.3 %
IMMATURE GRANULOCYTE RATIO %: 0.4 %
IMMATURE GRANULOCYTE RATIO %: 0.4 %
IMMATURE GRANULOCYTE RATIO %: 0.5 %
IMMATURE GRANULOCYTE RATIO %: 0.5 %
IMMATURE GRANULOCYTE RATIO %: 0.7 %
INR BLD: 1.31 (ref 0.9–1.1)
INR BLD: 1.39 (ref 0.9–1.1)
KETONES UR STRIP.AUTO-MCNC: NEGATIVE MG/DL
LDLC SERPL CALC-MCNC: 178 MG/DL (ref ?–130)
LEVETIRACETAM (KEPPRA): 14 UG/ML
LYMPHOCYTES # BLD AUTO: 0.41 X10(3) UL (ref 0.9–4)
LYMPHOCYTES # BLD AUTO: 0.42 X10(3) UL (ref 0.9–4)
LYMPHOCYTES # BLD AUTO: 0.44 X10(3) UL (ref 0.9–4)
LYMPHOCYTES # BLD AUTO: 0.45 X10(3) UL (ref 0.9–4)
LYMPHOCYTES # BLD AUTO: 0.89 X10(3) UL (ref 0.9–4)
LYMPHOCYTES # BLD AUTO: 0.93 X10(3) UL (ref 0.9–4)
LYMPHOCYTES # BLD AUTO: 1.07 X10(3) UL (ref 0.9–4)
LYMPHOCYTES # BLD AUTO: 1.17 X10(3) UL (ref 0.9–4)
LYMPHOCYTES # BLD AUTO: 1.21 X10(3) UL (ref 0.9–4)
LYMPHOCYTES # BLD AUTO: 1.26 X10(3) UL (ref 0.9–4)
LYMPHOCYTES # BLD AUTO: 1.39 X10(3) UL (ref 0.9–4)
LYMPHOCYTES NFR BLD AUTO: 12.9 %
LYMPHOCYTES NFR BLD AUTO: 13.4 %
LYMPHOCYTES NFR BLD AUTO: 14.4 %
LYMPHOCYTES NFR BLD AUTO: 2.1 %
LYMPHOCYTES NFR BLD AUTO: 2.3 %
LYMPHOCYTES NFR BLD AUTO: 2.6 %
LYMPHOCYTES NFR BLD AUTO: 4 %
LYMPHOCYTES NFR BLD AUTO: 7.2 %
LYMPHOCYTES NFR BLD AUTO: 7.5 %
LYMPHOCYTES NFR BLD AUTO: 8.2 %
LYMPHOCYTES NFR BLD AUTO: 9 %
M PROTEIN MFR SERPL ELPH: 5.4 G/DL (ref 6.1–8.3)
M PROTEIN MFR SERPL ELPH: 5.7 G/DL (ref 6.1–8.3)
M PROTEIN MFR SERPL ELPH: 6.4 G/DL (ref 6.1–8.3)
M PROTEIN MFR SERPL ELPH: 6.5 G/DL (ref 6.1–8.3)
M PROTEIN MFR SERPL ELPH: 6.6 G/DL (ref 6.1–8.3)
M PROTEIN MFR SERPL ELPH: 6.6 G/DL (ref 6.1–8.3)
M PROTEIN MFR SERPL ELPH: 6.9 G/DL (ref 6.1–8.3)
M PROTEIN MFR SERPL ELPH: 6.9 G/DL (ref 6.1–8.3)
M PROTEIN MFR SERPL ELPH: 7 G/DL (ref 6.1–8.3)
MCH RBC QN AUTO: 28.7 PG (ref 27–33.2)
MCH RBC QN AUTO: 28.8 PG (ref 27–33.2)
MCH RBC QN AUTO: 28.8 PG (ref 27–33.2)
MCH RBC QN AUTO: 29.1 PG (ref 27–33.2)
MCH RBC QN AUTO: 29.1 PG (ref 27–33.2)
MCH RBC QN AUTO: 29.2 PG (ref 27–33.2)
MCH RBC QN AUTO: 29.7 PG (ref 27–33.2)
MCH RBC QN AUTO: 29.8 PG (ref 27–33.2)
MCH RBC QN AUTO: 30 PG (ref 27–33.2)
MCH RBC QN AUTO: 30.2 PG (ref 27–33.2)
MCH RBC QN AUTO: 30.6 PG (ref 27–33.2)
MCHC RBC AUTO-ENTMCNC: 31.9 G/DL (ref 31–37)
MCHC RBC AUTO-ENTMCNC: 32.4 G/DL (ref 31–37)
MCHC RBC AUTO-ENTMCNC: 32.7 G/DL (ref 31–37)
MCHC RBC AUTO-ENTMCNC: 33.1 G/DL (ref 31–37)
MCHC RBC AUTO-ENTMCNC: 33.2 G/DL (ref 31–37)
MCHC RBC AUTO-ENTMCNC: 33.6 G/DL (ref 31–37)
MCHC RBC AUTO-ENTMCNC: 33.8 G/DL (ref 31–37)
MCHC RBC AUTO-ENTMCNC: 33.8 G/DL (ref 31–37)
MCHC RBC AUTO-ENTMCNC: 33.9 G/DL (ref 31–37)
MCHC RBC AUTO-ENTMCNC: 34.7 G/DL (ref 31–37)
MCHC RBC AUTO-ENTMCNC: 35.3 G/DL (ref 31–37)
MCV RBC AUTO: 83.9 FL (ref 80–99)
MCV RBC AUTO: 84.1 FL (ref 80–99)
MCV RBC AUTO: 85.4 FL (ref 80–99)
MCV RBC AUTO: 85.9 FL (ref 80–99)
MCV RBC AUTO: 86.4 FL (ref 80–99)
MCV RBC AUTO: 86.9 FL (ref 80–99)
MCV RBC AUTO: 88.1 FL (ref 80–99)
MCV RBC AUTO: 88.9 FL (ref 80–99)
MCV RBC AUTO: 91 FL (ref 80–99)
MCV RBC AUTO: 94.1 FL (ref 80–99)
MCV RBC AUTO: 94.4 FL (ref 80–99)
MONOCYTES # BLD AUTO: 0.53 X10(3) UL (ref 0.1–1)
MONOCYTES # BLD AUTO: 0.94 X10(3) UL (ref 0.1–1)
MONOCYTES # BLD AUTO: 0.95 X10(3) UL (ref 0.1–0.6)
MONOCYTES # BLD AUTO: 0.99 X10(3) UL (ref 0.1–0.6)
MONOCYTES # BLD AUTO: 1.12 X10(3) UL (ref 0.1–0.6)
MONOCYTES # BLD AUTO: 1.3 X10(3) UL (ref 0.1–1)
MONOCYTES # BLD AUTO: 1.31 X10(3) UL (ref 0.1–1)
MONOCYTES # BLD AUTO: 1.32 X10(3) UL (ref 0.1–1)
MONOCYTES # BLD AUTO: 1.35 X10(3) UL (ref 0.1–1)
MONOCYTES # BLD AUTO: 1.85 X10(3) UL (ref 0.1–1)
MONOCYTES # BLD AUTO: 2.46 X10(3) UL (ref 0.1–1)
MONOCYTES NFR BLD AUTO: 10.9 %
MONOCYTES NFR BLD AUTO: 11.1 %
MONOCYTES NFR BLD AUTO: 11.3 %
MONOCYTES NFR BLD AUTO: 11.3 %
MONOCYTES NFR BLD AUTO: 11.5 %
MONOCYTES NFR BLD AUTO: 11.9 %
MONOCYTES NFR BLD AUTO: 12.7 %
MONOCYTES NFR BLD AUTO: 5.1 %
MONOCYTES NFR BLD AUTO: 5.5 %
MONOCYTES NFR BLD AUTO: 6.8 %
MONOCYTES NFR BLD AUTO: 9.5 %
NEUTROPHIL ABS PRELIM: 15.32 X10 (3) UL (ref 1.3–6.7)
NEUTROPHIL ABS PRELIM: 15.64 X10 (3) UL (ref 1.3–6.7)
NEUTROPHIL ABS PRELIM: 17.16 X10 (3) UL (ref 1.3–6.7)
NEUTROPHIL ABS PRELIM: 17.41 X10 (3) UL (ref 1.3–6.7)
NEUTROPHIL ABS PRELIM: 6.21 X10 (3) UL (ref 1.3–6.7)
NEUTROPHIL ABS PRELIM: 6.31 X10 (3) UL (ref 1.3–6.7)
NEUTROPHIL ABS PRELIM: 6.83 X10 (3) UL (ref 1.3–6.7)
NEUTROPHIL ABS PRELIM: 8.94 X10 (3) UL (ref 1.3–6.7)
NEUTROPHIL ABS PRELIM: 9.33 X10 (3) UL (ref 1.3–6.7)
NEUTROPHIL ABS PRELIM: 9.42 X10 (3) UL (ref 1.3–6.7)
NEUTROPHIL ABS PRELIM: 9.5 X10 (3) UL (ref 1.3–6.7)
NEUTROPHILS # BLD AUTO: 15.32 X10(3) UL (ref 1.3–6.7)
NEUTROPHILS # BLD AUTO: 15.64 X10(3) UL (ref 1.3–6.7)
NEUTROPHILS # BLD AUTO: 17.16 X10(3) UL (ref 1.3–6.7)
NEUTROPHILS # BLD AUTO: 17.41 X10(3) UL (ref 1.3–6.7)
NEUTROPHILS # BLD AUTO: 6.21 X10(3) UL (ref 1.3–6.7)
NEUTROPHILS # BLD AUTO: 6.31 X10(3) UL (ref 1.3–6.7)
NEUTROPHILS # BLD AUTO: 6.83 X10(3) UL (ref 1.3–6.7)
NEUTROPHILS # BLD AUTO: 8.94 X10(3) UL (ref 1.3–6.7)
NEUTROPHILS # BLD AUTO: 9.33 X10(3) UL (ref 1.3–6.7)
NEUTROPHILS # BLD AUTO: 9.42 X10(3) UL (ref 1.3–6.7)
NEUTROPHILS # BLD AUTO: 9.5 X10(3) UL (ref 1.3–6.7)
NEUTROPHILS NFR BLD AUTO: 71.1 %
NEUTROPHILS NFR BLD AUTO: 72.6 %
NEUTROPHILS NFR BLD AUTO: 72.8 %
NEUTROPHILS NFR BLD AUTO: 78.7 %
NEUTROPHILS NFR BLD AUTO: 78.8 %
NEUTROPHILS NFR BLD AUTO: 79.2 %
NEUTROPHILS NFR BLD AUTO: 79.7 %
NEUTROPHILS NFR BLD AUTO: 87.6 %
NEUTROPHILS NFR BLD AUTO: 90.1 %
NEUTROPHILS NFR BLD AUTO: 90.5 %
NEUTROPHILS NFR BLD AUTO: 91.3 %
NITRITE UR QL STRIP.AUTO: NEGATIVE
NONHDLC SERPL-MCNC: 206 MG/DL (ref ?–130)
P AXIS: 37 DEGREES
P-R INTERVAL: 154 MS
PH UR STRIP.AUTO: 6 [PH] (ref 4.5–8)
PLATELET # BLD AUTO: 227 10(3)UL (ref 150–450)
PLATELET # BLD AUTO: 228 10(3)UL (ref 150–450)
PLATELET # BLD AUTO: 229 10(3)UL (ref 150–450)
PLATELET # BLD AUTO: 230 10(3)UL (ref 150–450)
PLATELET # BLD AUTO: 235 10(3)UL (ref 150–450)
PLATELET # BLD AUTO: 252 10(3)UL (ref 150–450)
PLATELET # BLD AUTO: 253 10(3)UL (ref 150–450)
PLATELET # BLD AUTO: 259 10(3)UL (ref 150–450)
PLATELET # BLD AUTO: 260 10(3)UL (ref 150–450)
PLATELET # BLD AUTO: 284 10(3)UL (ref 150–450)
PLATELET # BLD AUTO: 350 10(3)UL (ref 150–450)
POTASSIUM SERPL-SCNC: 3.8 MMOL/L (ref 3.6–5.1)
POTASSIUM SERPL-SCNC: 3.9 MMOL/L (ref 3.6–5.1)
POTASSIUM SERPL-SCNC: 3.9 MMOL/L (ref 3.6–5.1)
POTASSIUM SERPL-SCNC: 4 MMOL/L (ref 3.6–5.1)
POTASSIUM SERPL-SCNC: 4 MMOL/L (ref 3.6–5.1)
POTASSIUM SERPL-SCNC: 4.1 MMOL/L (ref 3.6–5.1)
POTASSIUM SERPL-SCNC: 4.2 MMOL/L (ref 3.6–5.1)
PROT UR STRIP.AUTO-MCNC: NEGATIVE MG/DL
PSA SERPL DL<=0.01 NG/ML-MCNC: 16.8 SECONDS (ref 12.4–14.7)
PSA SERPL DL<=0.01 NG/ML-MCNC: 17.6 SECONDS (ref 12.4–14.7)
Q-T INTERVAL: 374 MS
QRS DURATION: 86 MS
QTC CALCULATION (BEZET): 460 MS
R AXIS: 4 DEGREES
RBC # BLD AUTO: 2.76 X10(6)UL (ref 3.8–5.8)
RBC # BLD AUTO: 2.81 X10(6)UL (ref 3.8–5.8)
RBC # BLD AUTO: 2.82 X10(6)UL (ref 3.8–5.8)
RBC # BLD AUTO: 3.01 X10(6)UL (ref 3.8–5.8)
RBC # BLD AUTO: 3.05 X10(6)UL (ref 3.8–5.8)
RBC # BLD AUTO: 3.12 X10(6)UL (ref 3.8–5.8)
RBC # BLD AUTO: 3.23 X10(6)UL (ref 3.8–5.8)
RBC # BLD AUTO: 3.33 X10(6)UL (ref 3.8–5.8)
RBC # BLD AUTO: 3.34 X10(6)UL (ref 3.8–5.8)
RBC # BLD AUTO: 3.37 X10(6)UL (ref 3.8–5.8)
RBC # BLD AUTO: 3.4 X10(6)UL (ref 3.8–5.8)
RED CELL DISTRIBUTION WIDTH-SD: 50.6 FL (ref 35.1–46.3)
RED CELL DISTRIBUTION WIDTH-SD: 52.5 FL (ref 35.1–46.3)
RED CELL DISTRIBUTION WIDTH-SD: 53.9 FL (ref 35.1–46.3)
RED CELL DISTRIBUTION WIDTH-SD: 56.8 FL (ref 35.1–46.3)
RED CELL DISTRIBUTION WIDTH-SD: 57.6 FL (ref 35.1–46.3)
RED CELL DISTRIBUTION WIDTH-SD: 59.9 FL (ref 35.1–46.3)
RED CELL DISTRIBUTION WIDTH-SD: 62.7 FL (ref 35.1–46.3)
RED CELL DISTRIBUTION WIDTH-SD: 63.5 FL (ref 35.1–46.3)
RED CELL DISTRIBUTION WIDTH-SD: 64.8 FL (ref 35.1–46.3)
SODIUM SERPL-SCNC: 134 MMOL/L (ref 136–144)
SODIUM SERPL-SCNC: 135 MMOL/L (ref 136–144)
SODIUM SERPL-SCNC: 136 MMOL/L (ref 136–144)
SODIUM SERPL-SCNC: 136 MMOL/L (ref 136–144)
SODIUM SERPL-SCNC: 137 MMOL/L (ref 136–144)
SODIUM SERPL-SCNC: 139 MMOL/L (ref 136–144)
SODIUM SERPL-SCNC: 141 MMOL/L (ref 136–144)
SODIUM SERPL-SCNC: 141 MMOL/L (ref 136–144)
SP GR UR STRIP.AUTO: 1 (ref 1–1.03)
T AXIS: 16 DEGREES
TRIGL SERPL-MCNC: 142 MG/DL (ref ?–150)
TROPONIN: <0.046 NG/ML (ref ?–0.05)
TSI SER-ACNC: 0.83 MIU/ML (ref 0.35–5.5)
UROBILINOGEN UR STRIP.AUTO-MCNC: <2 MG/DL
VENTRICULAR RATE: 91 BPM
VLDLC SERPL CALC-MCNC: 28 MG/DL (ref 5–40)
WBC # BLD AUTO: 10.4 X10(3) UL (ref 4–13)
WBC # BLD AUTO: 11.3 X10(3) UL (ref 4–13)
WBC # BLD AUTO: 11.9 X10(3) UL (ref 4–13)
WBC # BLD AUTO: 11.9 X10(3) UL (ref 4–13)
WBC # BLD AUTO: 17.1 X10(3) UL (ref 4–13)
WBC # BLD AUTO: 19.3 X10(3) UL (ref 4–13)
WBC # BLD AUTO: 19.4 X10(3) UL (ref 4–13)
WBC # BLD AUTO: 19.6 X10(3) UL (ref 4–13)
WBC # BLD AUTO: 8.7 X10(3) UL (ref 4–13)
WBC # BLD AUTO: 8.8 X10(3) UL (ref 4–13)
WBC # BLD AUTO: 9.4 X10(3) UL (ref 4–13)

## 2018-01-01 PROCEDURE — 99214 OFFICE O/P EST MOD 30 MIN: CPT

## 2018-01-01 PROCEDURE — 85025 COMPLETE CBC W/AUTO DIFF WBC: CPT

## 2018-01-01 PROCEDURE — 80053 COMPREHEN METABOLIC PANEL: CPT

## 2018-01-01 PROCEDURE — 99232 SBSQ HOSP IP/OBS MODERATE 35: CPT | Performed by: OTHER

## 2018-01-01 PROCEDURE — 99233 SBSQ HOSP IP/OBS HIGH 50: CPT | Performed by: INTERNAL MEDICINE

## 2018-01-01 PROCEDURE — 74177 CT ABD & PELVIS W/CONTRAST: CPT | Performed by: EMERGENCY MEDICINE

## 2018-01-01 PROCEDURE — 82378 CARCINOEMBRYONIC ANTIGEN: CPT

## 2018-01-01 PROCEDURE — 99232 SBSQ HOSP IP/OBS MODERATE 35: CPT | Performed by: INTERNAL MEDICINE

## 2018-01-01 PROCEDURE — G9678 ONCOLOGY CARE MODEL SERVICE: HCPCS | Performed by: INTERNAL MEDICINE

## 2018-01-01 PROCEDURE — 99221 1ST HOSP IP/OBS SF/LOW 40: CPT | Performed by: CLINICAL NURSE SPECIALIST

## 2018-01-01 PROCEDURE — 99223 1ST HOSP IP/OBS HIGH 75: CPT | Performed by: HOSPITALIST

## 2018-01-01 PROCEDURE — 87086 URINE CULTURE/COLONY COUNT: CPT

## 2018-01-01 PROCEDURE — 72125 CT NECK SPINE W/O DYE: CPT | Performed by: EMERGENCY MEDICINE

## 2018-01-01 PROCEDURE — 99291 CRITICAL CARE FIRST HOUR: CPT | Performed by: OTHER

## 2018-01-01 PROCEDURE — 77295 3-D RADIOTHERAPY PLAN: CPT | Performed by: RADIOLOGY

## 2018-01-01 PROCEDURE — 99214 OFFICE O/P EST MOD 30 MIN: CPT | Performed by: INTERNAL MEDICINE

## 2018-01-01 PROCEDURE — 99233 SBSQ HOSP IP/OBS HIGH 50: CPT | Performed by: HOSPITALIST

## 2018-01-01 PROCEDURE — 99232 SBSQ HOSP IP/OBS MODERATE 35: CPT | Performed by: HOSPITALIST

## 2018-01-01 PROCEDURE — 99233 SBSQ HOSP IP/OBS HIGH 50: CPT | Performed by: OTHER

## 2018-01-01 PROCEDURE — 99306 1ST NF CARE HIGH MDM 50: CPT | Performed by: FAMILY MEDICINE

## 2018-01-01 PROCEDURE — 71101 X-RAY EXAM UNILAT RIBS/CHEST: CPT | Performed by: EMERGENCY MEDICINE

## 2018-01-01 PROCEDURE — 77399 UNLISTED PX MED RADJ PHYSICS: CPT | Performed by: RADIOLOGY

## 2018-01-01 PROCEDURE — 96413 CHEMO IV INFUSION 1 HR: CPT

## 2018-01-01 PROCEDURE — 36415 COLL VENOUS BLD VENIPUNCTURE: CPT

## 2018-01-01 PROCEDURE — 84443 ASSAY THYROID STIM HORMONE: CPT

## 2018-01-01 PROCEDURE — 99310 SBSQ NF CARE HIGH MDM 45: CPT | Performed by: NURSE PRACTITIONER

## 2018-01-01 PROCEDURE — 87493 C DIFF AMPLIFIED PROBE: CPT

## 2018-01-01 PROCEDURE — 77470 SPECIAL RADIATION TREATMENT: CPT | Performed by: RADIOLOGY

## 2018-01-01 PROCEDURE — 81001 URINALYSIS AUTO W/SCOPE: CPT

## 2018-01-01 PROCEDURE — 77290 THER RAD SIMULAJ FIELD CPLX: CPT | Performed by: RADIOLOGY

## 2018-01-01 PROCEDURE — 99309 SBSQ NF CARE MODERATE MDM 30: CPT | Performed by: NURSE PRACTITIONER

## 2018-01-01 PROCEDURE — 77334 RADIATION TREATMENT AID(S): CPT | Performed by: RADIOLOGY

## 2018-01-01 PROCEDURE — 83735 ASSAY OF MAGNESIUM: CPT

## 2018-01-01 PROCEDURE — 99214 OFFICE O/P EST MOD 30 MIN: CPT | Performed by: NURSE PRACTITIONER

## 2018-01-01 PROCEDURE — 73030 X-RAY EXAM OF SHOULDER: CPT | Performed by: EMERGENCY MEDICINE

## 2018-01-01 PROCEDURE — 99215 OFFICE O/P EST HI 40 MIN: CPT | Performed by: NURSE PRACTITIONER

## 2018-01-01 PROCEDURE — 77300 RADIATION THERAPY DOSE PLAN: CPT | Performed by: RADIOLOGY

## 2018-01-01 PROCEDURE — 74177 CT ABD & PELVIS W/CONTRAST: CPT | Performed by: INTERNAL MEDICINE

## 2018-01-01 PROCEDURE — 99223 1ST HOSP IP/OBS HIGH 75: CPT | Performed by: INTERNAL MEDICINE

## 2018-01-01 PROCEDURE — 95816 EEG AWAKE AND DROWSY: CPT | Performed by: OTHER

## 2018-01-01 PROCEDURE — 99307 SBSQ NF CARE SF MDM 10: CPT | Performed by: NURSE PRACTITIONER

## 2018-01-01 PROCEDURE — 87040 BLOOD CULTURE FOR BACTERIA: CPT

## 2018-01-01 PROCEDURE — 99231 SBSQ HOSP IP/OBS SF/LOW 25: CPT | Performed by: CLINICAL NURSE SPECIALIST

## 2018-01-01 PROCEDURE — 70450 CT HEAD/BRAIN W/O DYE: CPT | Performed by: INTERNAL MEDICINE

## 2018-01-01 PROCEDURE — 70450 CT HEAD/BRAIN W/O DYE: CPT | Performed by: EMERGENCY MEDICINE

## 2018-01-01 PROCEDURE — 71260 CT THORAX DX C+: CPT | Performed by: INTERNAL MEDICINE

## 2018-01-01 PROCEDURE — 99239 HOSP IP/OBS DSCHRG MGMT >30: CPT | Performed by: HOSPITALIST

## 2018-01-01 RX ORDER — MEPERIDINE HYDROCHLORIDE 25 MG/ML
INJECTION INTRAMUSCULAR; INTRAVENOUS; SUBCUTANEOUS AS NEEDED
Status: CANCELLED | OUTPATIENT
Start: 2018-01-01

## 2018-01-01 RX ORDER — ACETAMINOPHEN 650 MG/1
650 SUPPOSITORY RECTAL EVERY 6 HOURS PRN
Status: DISCONTINUED | OUTPATIENT
Start: 2018-01-01 | End: 2018-01-01

## 2018-01-01 RX ORDER — SODIUM CHLORIDE 9 MG/ML
1000 INJECTION, SOLUTION INTRAVENOUS ONCE
Status: COMPLETED | OUTPATIENT
Start: 2018-01-01 | End: 2018-01-01

## 2018-01-01 RX ORDER — DEXAMETHASONE SODIUM PHOSPHATE 4 MG/ML
4 VIAL (ML) INJECTION EVERY 12 HOURS
Status: DISCONTINUED | OUTPATIENT
Start: 2018-01-01 | End: 2018-01-01

## 2018-01-01 RX ORDER — MORPHINE SULFATE 15 MG/1
15 TABLET, FILM COATED, EXTENDED RELEASE ORAL EVERY 12 HOURS SCHEDULED
Qty: 60 TABLET | Refills: 0 | Status: SHIPPED | OUTPATIENT
Start: 2018-01-01 | End: 2018-01-01

## 2018-01-01 RX ORDER — DEXAMETHASONE SODIUM PHOSPHATE 4 MG/ML
4 VIAL (ML) INJECTION EVERY 6 HOURS
Status: DISCONTINUED | OUTPATIENT
Start: 2018-01-01 | End: 2018-01-01

## 2018-01-01 RX ORDER — SODIUM CHLORIDE 0.9 % (FLUSH) 0.9 %
10 SYRINGE (ML) INJECTION ONCE
Status: CANCELLED | OUTPATIENT
Start: 2018-01-01

## 2018-01-01 RX ORDER — ALBUTEROL SULFATE 90 UG/1
2 AEROSOL, METERED RESPIRATORY (INHALATION) AS NEEDED
Status: CANCELLED | OUTPATIENT
Start: 2018-01-01

## 2018-01-01 RX ORDER — RANITIDINE 25 MG/ML
50 INJECTION, SOLUTION INTRAMUSCULAR; INTRAVENOUS AS NEEDED
Status: CANCELLED | OUTPATIENT
Start: 2018-01-01

## 2018-01-01 RX ORDER — SENNOSIDES 8.6 MG
17.2 TABLET ORAL NIGHTLY
Status: DISCONTINUED | OUTPATIENT
Start: 2018-01-01 | End: 2018-01-01

## 2018-01-01 RX ORDER — ATORVASTATIN CALCIUM 40 MG/1
40 TABLET, FILM COATED ORAL NIGHTLY
Status: DISCONTINUED | OUTPATIENT
Start: 2018-01-01 | End: 2018-01-01

## 2018-01-01 RX ORDER — ACETAMINOPHEN 325 MG/1
TABLET ORAL EVERY 6 HOURS PRN
Status: CANCELLED | OUTPATIENT
Start: 2018-01-01

## 2018-01-01 RX ORDER — FAMOTIDINE 20 MG/1
20 TABLET ORAL DAILY
Status: DISCONTINUED | OUTPATIENT
Start: 2018-01-01 | End: 2018-01-01

## 2018-01-01 RX ORDER — ONDANSETRON 2 MG/ML
4 INJECTION INTRAMUSCULAR; INTRAVENOUS EVERY 6 HOURS PRN
Status: DISCONTINUED | OUTPATIENT
Start: 2018-01-01 | End: 2018-01-01

## 2018-01-01 RX ORDER — ACETAMINOPHEN 650 MG/1
650 SUPPOSITORY RECTAL EVERY 6 HOURS SCHEDULED
Status: DISCONTINUED | OUTPATIENT
Start: 2018-01-01 | End: 2018-01-01

## 2018-01-01 RX ORDER — MORPHINE SULFATE 4 MG/ML
1 INJECTION, SOLUTION INTRAMUSCULAR; INTRAVENOUS EVERY 2 HOUR PRN
Status: DISCONTINUED | OUTPATIENT
Start: 2018-01-01 | End: 2018-01-01

## 2018-01-01 RX ORDER — SCOLOPAMINE TRANSDERMAL SYSTEM 1 MG/1
2 PATCH, EXTENDED RELEASE TRANSDERMAL
Status: DISCONTINUED | OUTPATIENT
Start: 2018-01-01 | End: 2018-01-01

## 2018-01-01 RX ORDER — HALOPERIDOL 5 MG
5 TABLET ORAL EVERY 6 HOURS PRN
Status: DISCONTINUED | OUTPATIENT
Start: 2018-01-01 | End: 2018-01-01

## 2018-01-01 RX ORDER — DEXAMETHASONE SODIUM PHOSPHATE 4 MG/ML
10 VIAL (ML) INJECTION ONCE
Status: COMPLETED | OUTPATIENT
Start: 2018-01-01 | End: 2018-01-01

## 2018-01-01 RX ORDER — DIPHENHYDRAMINE HYDROCHLORIDE 50 MG/ML
INJECTION INTRAMUSCULAR; INTRAVENOUS EVERY 4 HOURS PRN
Status: CANCELLED | OUTPATIENT
Start: 2018-01-01

## 2018-01-01 RX ORDER — MORPHINE SULFATE 4 MG/ML
4 INJECTION, SOLUTION INTRAMUSCULAR; INTRAVENOUS ONCE
Status: DISCONTINUED | OUTPATIENT
Start: 2018-01-01 | End: 2018-01-01

## 2018-01-01 RX ORDER — FAMOTIDINE 10 MG/ML
20 INJECTION, SOLUTION INTRAVENOUS DAILY
Status: DISCONTINUED | OUTPATIENT
Start: 2018-01-01 | End: 2018-01-01

## 2018-01-01 RX ORDER — ONDANSETRON 2 MG/ML
4 INJECTION INTRAMUSCULAR; INTRAVENOUS EVERY 4 HOURS PRN
Status: DISCONTINUED | OUTPATIENT
Start: 2018-01-01 | End: 2018-01-01

## 2018-01-01 RX ORDER — LEVETIRACETAM 500 MG/1
500 TABLET ORAL 2 TIMES DAILY
Qty: 60 TABLET | Refills: 1 | Status: SHIPPED | OUTPATIENT
Start: 2018-01-01

## 2018-01-01 RX ORDER — LORAZEPAM 2 MG/ML
0.5 INJECTION INTRAMUSCULAR EVERY 6 HOURS PRN
Status: DISCONTINUED | OUTPATIENT
Start: 2018-01-01 | End: 2018-01-01

## 2018-01-01 RX ORDER — HALOPERIDOL 1 MG/1
1 TABLET ORAL 3 TIMES DAILY
Qty: 10 TABLET | Refills: 0 | Status: SHIPPED | OUTPATIENT
Start: 2018-01-01

## 2018-01-01 RX ORDER — ACETAMINOPHEN 160 MG/5ML
650 SOLUTION ORAL EVERY 6 HOURS SCHEDULED
Status: DISCONTINUED | OUTPATIENT
Start: 2018-01-01 | End: 2018-01-01

## 2018-01-01 RX ORDER — MORPHINE SULFATE 15 MG/1
15 TABLET, FILM COATED, EXTENDED RELEASE ORAL EVERY 12 HOURS PRN
Status: DISCONTINUED | OUTPATIENT
Start: 2018-01-01 | End: 2018-01-01

## 2018-01-01 RX ORDER — DOCUSATE SODIUM 100 MG/1
100 CAPSULE, LIQUID FILLED ORAL 2 TIMES DAILY
Status: DISCONTINUED | OUTPATIENT
Start: 2018-01-01 | End: 2018-01-01

## 2018-01-01 RX ORDER — AMLODIPINE BESYLATE 5 MG/1
5 TABLET ORAL
Status: DISCONTINUED | OUTPATIENT
Start: 2018-01-01 | End: 2018-01-01

## 2018-01-01 RX ORDER — DEXAMETHASONE SODIUM PHOSPHATE 4 MG/ML
4 VIAL (ML) INJECTION EVERY 8 HOURS
Status: DISCONTINUED | OUTPATIENT
Start: 2018-01-01 | End: 2018-01-01

## 2018-01-01 RX ORDER — AMLODIPINE BESYLATE 5 MG/1
5 TABLET ORAL
Qty: 60 TABLET | Refills: 2 | Status: SHIPPED | OUTPATIENT
Start: 2018-01-01

## 2018-01-01 RX ORDER — SULFAMETHOXAZOLE AND TRIMETHOPRIM 800; 160 MG/1; MG/1
1 TABLET ORAL EVERY 12 HOURS SCHEDULED
Status: DISCONTINUED | OUTPATIENT
Start: 2018-01-01 | End: 2018-01-01

## 2018-01-01 RX ORDER — SODIUM CHLORIDE 0.9 % (FLUSH) 0.9 %
10 SYRINGE (ML) INJECTION ONCE
Status: COMPLETED | OUTPATIENT
Start: 2018-01-01 | End: 2018-01-01

## 2018-01-01 RX ORDER — BISACODYL 10 MG
10 SUPPOSITORY, RECTAL RECTAL
Status: DISCONTINUED | OUTPATIENT
Start: 2018-01-01 | End: 2018-01-01

## 2018-01-01 RX ORDER — LORAZEPAM 2 MG/ML
1 INJECTION INTRAMUSCULAR
Status: DISCONTINUED | OUTPATIENT
Start: 2018-01-01 | End: 2018-01-01

## 2018-01-01 RX ORDER — PHENYLEPHRINE HCL IN 0.9% NACL 50MG/250ML
PLASTIC BAG, INJECTION (ML) INTRAVENOUS CONTINUOUS PRN
Status: DISCONTINUED | OUTPATIENT
Start: 2018-01-01 | End: 2018-01-01

## 2018-01-01 RX ORDER — DEXAMETHASONE 4 MG/1
4 TABLET ORAL 3 TIMES DAILY
Qty: 90 TABLET | Refills: 0 | Status: SHIPPED | OUTPATIENT
Start: 2018-01-01

## 2018-01-01 RX ORDER — TEMAZEPAM 15 MG/1
15 CAPSULE ORAL NIGHTLY
COMMUNITY
End: 2018-01-01 | Stop reason: DRUGHIGH

## 2018-01-01 RX ORDER — HYDROCODONE BITARTRATE AND ACETAMINOPHEN 7.5; 325 MG/1; MG/1
TABLET ORAL
Qty: 240 TABLET | Refills: 0 | Status: SHIPPED | OUTPATIENT
Start: 2018-01-01 | End: 2018-01-01

## 2018-01-01 RX ORDER — ACETAMINOPHEN 160 MG/5ML
650 SOLUTION ORAL EVERY 6 HOURS PRN
Status: DISCONTINUED | OUTPATIENT
Start: 2018-01-01 | End: 2018-01-01

## 2018-01-01 RX ORDER — SULFAMETHOXAZOLE AND TRIMETHOPRIM 400; 80 MG/1; MG/1
1 TABLET ORAL EVERY 12 HOURS SCHEDULED
Status: DISCONTINUED | OUTPATIENT
Start: 2018-01-01 | End: 2018-01-01 | Stop reason: DRUGHIGH

## 2018-01-01 RX ORDER — ATORVASTATIN CALCIUM 40 MG/1
40 TABLET, FILM COATED ORAL NIGHTLY
Qty: 20 TABLET | Refills: 0 | Status: SHIPPED | OUTPATIENT
Start: 2018-01-01

## 2018-01-01 RX ORDER — SODIUM PHOSPHATE, DIBASIC AND SODIUM PHOSPHATE, MONOBASIC 7; 19 G/133ML; G/133ML
1 ENEMA RECTAL ONCE AS NEEDED
Status: DISCONTINUED | OUTPATIENT
Start: 2018-01-01 | End: 2018-01-01

## 2018-01-01 RX ORDER — HALOPERIDOL 5 MG/ML
1 INJECTION INTRAMUSCULAR EVERY 4 HOURS PRN
Status: DISCONTINUED | OUTPATIENT
Start: 2018-01-01 | End: 2018-01-01

## 2018-01-01 RX ORDER — TEMAZEPAM 30 MG/1
30 CAPSULE ORAL NIGHTLY PRN
COMMUNITY
End: 2018-01-01 | Stop reason: ALTCHOICE

## 2018-01-01 RX ORDER — LORAZEPAM 2 MG/ML
1 INJECTION INTRAMUSCULAR EVERY 6 HOURS PRN
Status: DISCONTINUED | OUTPATIENT
Start: 2018-01-01 | End: 2018-01-01

## 2018-01-01 RX ORDER — MORPHINE SULFATE 15 MG/1
15 TABLET, FILM COATED, EXTENDED RELEASE ORAL EVERY 12 HOURS SCHEDULED
Qty: 10 TABLET | Refills: 0 | Status: SHIPPED | OUTPATIENT
Start: 2018-01-01

## 2018-01-01 RX ORDER — POLYETHYLENE GLYCOL 3350 17 G/17G
17 POWDER, FOR SOLUTION ORAL DAILY PRN
Status: DISCONTINUED | OUTPATIENT
Start: 2018-01-01 | End: 2018-01-01

## 2018-01-01 RX ORDER — GLYCOPYRROLATE 0.2 MG/ML
0.4 INJECTION, SOLUTION INTRAMUSCULAR; INTRAVENOUS
Status: DISCONTINUED | OUTPATIENT
Start: 2018-01-01 | End: 2018-01-01

## 2018-01-01 RX ORDER — METOCLOPRAMIDE HYDROCHLORIDE 5 MG/ML
10 INJECTION INTRAMUSCULAR; INTRAVENOUS EVERY 8 HOURS PRN
Status: DISCONTINUED | OUTPATIENT
Start: 2018-01-01 | End: 2018-01-01

## 2018-01-01 RX ORDER — SULFAMETHOXAZOLE AND TRIMETHOPRIM 800; 160 MG/1; MG/1
1 TABLET ORAL 2 TIMES DAILY
COMMUNITY
Start: 2018-01-01 | End: 2018-01-01

## 2018-01-01 RX ORDER — SODIUM CHLORIDE 9 MG/ML
INJECTION, SOLUTION INTRAVENOUS CONTINUOUS
Status: DISCONTINUED | OUTPATIENT
Start: 2018-01-01 | End: 2018-01-01

## 2018-01-01 RX ORDER — ACETAMINOPHEN 650 MG/1
650 SUPPOSITORY RECTAL EVERY 4 HOURS PRN
Status: DISCONTINUED | OUTPATIENT
Start: 2018-01-01 | End: 2018-01-01

## 2018-01-01 RX ORDER — SULFAMETHOXAZOLE AND TRIMETHOPRIM 800; 160 MG/1; MG/1
1 TABLET ORAL EVERY 12 HOURS SCHEDULED
Qty: 13 TABLET | Refills: 0 | Status: SHIPPED | OUTPATIENT
Start: 2018-01-01 | End: 2018-01-01

## 2018-01-01 RX ORDER — LEVETIRACETAM 500 MG/1
500 TABLET ORAL 2 TIMES DAILY
Status: DISCONTINUED | OUTPATIENT
Start: 2018-01-01 | End: 2018-01-01

## 2018-01-01 RX ORDER — ALPRAZOLAM 0.5 MG/1
0.5 TABLET ORAL 3 TIMES DAILY PRN
COMMUNITY

## 2018-01-01 RX ORDER — PANTOPRAZOLE SODIUM 40 MG/1
40 TABLET, DELAYED RELEASE ORAL
COMMUNITY

## 2018-01-01 RX ORDER — MORPHINE SULFATE 4 MG/ML
2 INJECTION, SOLUTION INTRAMUSCULAR; INTRAVENOUS EVERY 2 HOUR PRN
Status: DISCONTINUED | OUTPATIENT
Start: 2018-01-01 | End: 2018-01-01

## 2018-01-01 RX ORDER — SODIUM CHLORIDE 9 MG/ML
INJECTION, SOLUTION INTRAVENOUS CONTINUOUS
Status: ACTIVE | OUTPATIENT
Start: 2018-01-01 | End: 2018-01-01

## 2018-01-01 RX ORDER — LABETALOL HYDROCHLORIDE 5 MG/ML
10 INJECTION, SOLUTION INTRAVENOUS EVERY 10 MIN PRN
Status: DISCONTINUED | OUTPATIENT
Start: 2018-01-01 | End: 2018-01-01

## 2018-01-01 RX ORDER — MORPHINE SULFATE 15 MG/1
15 TABLET, FILM COATED, EXTENDED RELEASE ORAL EVERY 12 HOURS SCHEDULED
Status: ON HOLD | COMMUNITY
End: 2018-01-01

## 2018-01-01 RX ORDER — HALOPERIDOL 1 MG/1
1 TABLET ORAL 3 TIMES DAILY
Status: DISCONTINUED | OUTPATIENT
Start: 2018-01-01 | End: 2018-01-01

## 2018-01-01 RX ORDER — ACETAMINOPHEN 325 MG/1
650 TABLET ORAL EVERY 4 HOURS PRN
Status: DISCONTINUED | OUTPATIENT
Start: 2018-01-01 | End: 2018-01-01

## 2018-01-01 RX ADMIN — SODIUM CHLORIDE 0.9 % (FLUSH) 10 ML: 0.9 % SYRINGE (ML) INJECTION at 10:08:00

## 2018-01-01 RX ADMIN — SODIUM CHLORIDE 0.9 % (FLUSH) 10 ML: 0.9 % SYRINGE (ML) INJECTION at 14:42:00

## 2018-01-03 NOTE — PROGRESS NOTES
Pt here for 2 week MD f/u. Pt reports \"I feel 100% better than last time\". Pain has improved greatly, taking NOrco every 4 hours. Energy level has improved. Appetite is better, taste buds have returned and cold sensitivity is gone.  Pt has no further side

## 2018-01-03 NOTE — PROGRESS NOTES
Education Record  Learner:  Patient  Disease / Diagnosis:   Metastatic colon cancer  Barriers / Limitations:  None  Method:  Printed material and Reinforcement  General Topics:  Plan of care reviewed; schedule; side effect  Outcome:  Shows understanding an

## 2018-01-17 NOTE — PROGRESS NOTES
Pt here for 2 week MF f/u and due for treatment. Energy level is \"2/10\", appetite is \"zero\", tries to force himself to eat. Pt having a hard time sleeping with shoulder mass/pain, using Norco with little relief.

## 2018-01-17 NOTE — PROGRESS NOTES
Cancer Center Progress Note  Patient Name: Timothy Edwards   YOB: 1950   Medical Record Number: [id number]   Attending Physician: Gustavo Bernal M.D.   [facility]  Co-Medical Director, THE [facility] Lisandra complicated by an infusion reaction and Solucortef was added to the premedications.       History of Present Illness   Adithya Hoffmann is a 78year old male with metastatic colorectal cancer, as above, who presents for follow up and Cycle 3 of Opdivo being TABLET BY MOUTH ONE TIME DAILY , Disp: 30 tablet, Rfl: 1  •  AMLODIPINE BESYLATE 5 MG Oral Tab, TAKE ONE TABLET BY MOUTH EVERY DAY, Disp: 60 tablet, Rfl: 2  •  Rivaroxaban (XARELTO) 20 MG Oral Tab, Take 1 tablet (20 mg total) by mouth daily with food. , Dis no rhonchi or wheezing. Cardiovascular Normal - Regular rate and rhythm, no murmurs, gallops or rubs. Abdomen Normal - Non-tender, non-distended, no masses, ascites or hepatosplenomegaly. Extremities No cyanosis, clubbing or edema.  Unchanged palpabl travelling that far. We also discussed the availability of immunotherapy agents, on a compassionate use basis. His tumor was MSI stable, indicating a lower likelihood of response, but he prefers this route.  We reviewed the risks, benefits, and side effec

## 2018-01-18 NOTE — TELEPHONE ENCOUNTER
Received call from [address] [place] [address] ER requiring PA. PA initiated on covermymeds. com- marked as urgent.    Rx # [id number]  Key: [id number]

## 2018-01-24 PROBLEM — Z51.5 PALLIATIVE CARE BY SPECIALIST: Status: ACTIVE | Noted: 2018-01-01

## 2018-01-24 NOTE — PROGRESS NOTES
Palliative Care Consult Note    Patient Name: Ana Barbosa   YOB: 1950   Medical Record Number: [id number]   [facility]: [id number]   Date of visit: 1/24/2018       Chief Complaint/Reason for Visit:  Palliative care initial visit for pain contro name: N/A    Years of education: N/A  Number of children: 0     Occupational History  RETIRED  AT [employer]       Social History Main Topics   Smoking status: Current Every Day Smoker  1.00 Packs/day     Smokeless tobacco: Never Used    Comment: 4-5 No    Palliative Care:   Patient is afraid morphine will be sedating but he is willing to trial to get better pain control.   Discussed drug assistance programs to help with cost.  He wants to minimize medications so will trial morphine first.  He doesn't

## 2018-01-30 NOTE — PROGRESS NOTES
Palliative Care Follow up Note    Patient Name: Morelia Palomares   YOB: 1950   Medical Record Number: [id number]   CSN: [id number]   Date of visit: 1/30/2018       Chief Complaint/Reason for Visit:  Palliative follow up     History of Present ZURITA       Social History Main Topics   Smoking status: Current Every Day Smoker  1.00 Packs/day     Smokeless tobacco: Never Used    Comment: 4-5 cigarettes/day    Alcohol use No    Drug use: No    Sexual activity: Not on file     Other Topics Concern   N control and prevent dose failure which should help with sleep. Patient will increase morphine use to BID and is happy with pain relief. His goal is to try and sleep in bed with improved pain control. He declines trial of gabapentin for now.     Completed

## 2018-01-31 NOTE — PROGRESS NOTES
Pt here for MD f/u visit and opdivo. Pt continues to have left shoulder pain, 5/10. Taking MS ER 15mg bid which has helped with pain control. Energy level fair. Decrease appetite. No changes in ostomy output. Denies cough. SOB with exertion.    Education Re

## 2018-01-31 NOTE — PATIENT INSTRUCTIONS
For Dr. Jessica Valdivia nurse line, call  [phone] with any questions or concerns Monday through Friday 8:00 to 4:30.   After hours or weekends for emergent needs [phone]

## 2018-01-31 NOTE — PATIENT INSTRUCTIONS
To reach Dr Ervin Hernandez nurse during business hours, please call [phone]. After hours, including weekends, evenings, and holidays, please call the main number [phone] for emergent needs.

## 2018-01-31 NOTE — PROGRESS NOTES
Cancer Center Progress Note  Patient Name: Ronald Rogers   YOB: 1950   Medical Record Number: [id number]   Attending Physician: [facility]  Co-Medical Director, Margaret Murcia Lisandra reaction and Solucortef was added to the premedications.       History of Present Illness   Patel Alonso is a 78year old male with metastatic colorectal cancer, as above, who presents for follow up and Cycle 4 of Opdivo being used on compassionate use Rivaroxaban (XARELTO) 20 MG Oral Tab, Take 1 tablet (20 mg total) by mouth daily with food. , Disp: 26 tablet, Rfl: 11  •  hydrocodone-acetaminophen (NORCO) 7.5-325 MG Oral Tab, Take one to two tablets every 4 hours as needed for pain., Disp: 240 tablet, Rf Normal - Regular rate and rhythm, no murmurs. Abdomen Normal - Non-tender, non-distended, no masses, ascites or hepatosplenomegaly. Extremities No cyanosis, clubbing or edema. L shoulder mass unchanged.     Integumentary Normal - No rashes and noJaundi for breakthrough pain. PE: The patient is asymptomatic. Continue xarelto indefinitely. Anemia: secondary to chemo and chronic illness. No need for transfusion today.        Planned Follow Up:  2 weeks    High risk, Metastatic colon cancer on Chemothe

## 2018-02-13 NOTE — PROGRESS NOTES
Palliative Care Follow up Note    Patient Name: Kar Stanford   YOB: 1950   Medical Record Number: [id number]   CSN: [id number]   Date of visit: 2/13/2018       Chief Complaint/Reason for Visit:  Palliative follow up     History of Present heart racingfabián    Social History:     Social History  Social History   Marital status: Single  Spouse name: N/A    Years of education: N/A  Number of children: 0     Occupational History  RETIRED  AT [employer]       Social History Main Topics Directives Discussed and Completed: In EMR   POLST Discussed and Completed: In EMR. Patient is DNR    Palliative Care:   Pain control much improved with morphine so will continue at current dose.   He doesn't like to use a lot of medications and doesn'

## 2018-02-14 NOTE — PROGRESS NOTES
Pt here for follow up and treatment. See toxicities. Pt states he got his pain medications mixed up. He was taking morphine every 4 hours. He ran out on Saturday. He states his pain level would go down to a 5.   He is now taking norco.  He states it ta

## 2018-02-14 NOTE — PATIENT INSTRUCTIONS
To reach Dr Marcelo Rodriges nurse during business hours, please call [phone]. After hours, including weekends, evenings, and holidays, please call the main number [phone] for emergent needs.

## 2018-02-14 NOTE — PROGRESS NOTES
Cancer Center Progress Note  Patient Name: Kar Stanford   YOB: 1950   Medical Record Number: [id number]   Attending Physician: [facility]  Co-Medical Director, Heidi Murcia Lisandra and Solucortef was added to the premedications.       History of Present Illness   Juventino Lassiter is a 78year old male with metastatic colorectal cancer, as above, who presents for follow up and Cycle 5 of Opdivo being used on compassionate use basis.  Hi for pain., Disp: 240 tablet, Rfl: 0  •  PANTOPRAZOLE SODIUM 40 MG Oral Tab EC, TAKE ONE TABLET BY MOUTH ONE TIME DAILY , Disp: 30 tablet, Rfl: 1  •  AMLODIPINE BESYLATE 5 MG Oral Tab, TAKE ONE TABLET BY MOUTH EVERY DAY, Disp: 60 tablet, Rfl: 2  •  Rivaroxa supraclavicular, axillary or inguinal area. Respiratory Normal - Lungs CTA, no rhonchi or wheezing. Cardiovascular Normal - RRR, no murmurs, gallops or rubs. Abdomen Normal - Non-tender, non-distended, no masses, ascites or hepatosplenomegaly.     Ext interested in travelling that far. We also discussed the availability of immunotherapy agents, on a compassionate use basis. His tumor was MSI stable, indicating a lower likelihood of response, but he prefers this route.  We reviewed the risks, benefits,

## 2018-02-28 NOTE — PATIENT INSTRUCTIONS
For Dr. Wendie Lewis nurse line, call  [phone] with any questions or concerns Monday through Friday 8:00 to 4:30.   After hours or weekends for emergent needs [phone]

## 2018-02-28 NOTE — PROGRESS NOTES
Cancer Center Progress Note  Patient Name: Corina Beltran   YOB: 1950   Medical Record Number: [id number]   Attending Physician: Joelle Norton M.D.   [facility]  Co-Medical Director, Tanisha Murcia Lisandra reaction and Solucortef was added to the premedications.       History of Present Illness   Júnior Tierney is a 78year old male with metastatic colorectal cancer, as above, who presents for follow up and Cycle 6 of Opdivo being used on compassionate use TAKE ONE TABLET BY MOUTH ONE TIME DAILY , Disp: 30 tablet, Rfl: 1  •  AMLODIPINE BESYLATE 5 MG Oral Tab, TAKE ONE TABLET BY MOUTH EVERY DAY, Disp: 60 tablet, Rfl: 2  •  Rivaroxaban (XARELTO) 20 MG Oral Tab, Take 1 tablet (20 mg total) by mouth daily with f - Non-tender, non-distended, no masses, ascites or hepatosplenomegaly. Extremities Stable large L shoulder mass.    Integumentary Normal - No rashes, No Jaundice   Neurologic Normal - No sensory or motor deficits, normal cerebellar function, normal gait, interested in travelling that far. We also discussed the availability of immunotherapy agents, on a compassionate use basis. His tumor was MSI stable, indicating a lower likelihood of response, but he prefers this route.  We reviewed the risks, benefits,

## 2018-02-28 NOTE — PROGRESS NOTES
LIAN met with patient today and completed a handicapped form. SW gave this to him. LIAN had also completed his application for a prescription medication and filled out the Patient assistance form and faxed completed application and his financials to the PAP.

## 2018-02-28 NOTE — PROGRESS NOTES
Pt here for follow up and treatment. Pt states he is not sleeping well due to stress. He has had episodes of vomiting without nausea. He complains of abdominal cramps. He is taking morphine BID. He rates his pain level of a 7.

## 2018-03-05 NOTE — PROGRESS NOTES
LIAN faxed the completed Julianndarrian Winman [employer]n [employer] patient assistance foundation form to [phone] on 2-. This is for co-pay assistance for xarelto.

## 2018-03-05 NOTE — PROGRESS NOTES
SW refaxed the [employer] patient assistance application with additional financial information that they requested today.

## 2018-03-21 PROBLEM — C79.31 BRAIN METASTASES (HCC): Status: ACTIVE | Noted: 2018-01-01

## 2018-03-21 PROBLEM — R79.89 ELEVATED LFTS: Status: ACTIVE | Noted: 2018-01-01

## 2018-03-21 PROBLEM — R55 SYNCOPE AND COLLAPSE: Status: ACTIVE | Noted: 2018-01-01

## 2018-03-21 PROBLEM — I61.0 NONTRAUMATIC SUBCORTICAL HEMORRHAGE OF CEREBRAL HEMISPHERE (HCC): Status: ACTIVE | Noted: 2018-01-01

## 2018-03-21 PROBLEM — D64.9 ANEMIA: Status: ACTIVE | Noted: 2018-01-01

## 2018-03-21 PROBLEM — C79.31 BRAIN METASTASES: Status: ACTIVE | Noted: 2018-01-01

## 2018-03-21 NOTE — ED NOTES
Per MRI tech patient refusing MRI at start of exam. MRI department bringing patient back to room C5 at this time.

## 2018-03-21 NOTE — ED PROVIDER NOTES
Patient Seen in: [facility] 6ne-a    History   Patient presents with:  Syncope (cardiovascular, neurologic)    Stated Complaint: syncope/ fall    HPI    The patient is a 70-year-old male who presents emergency room with a history of multiple complai tobacco: Never Used                      Alcohol use: No                Review of Systems    Positive for stated complaint: syncope/ fall  Other systems are as noted in HPI. Constitutional and vital signs reviewed.       All other systems reviewed and nega deformity and swelling noted over the left shoulder where the patient is known to have metastatic disease and where there is some tenderness to palpation the left shoulder as well.   There is no other focal tenderness to palpation throughout the upper or lo DIFFERENTIAL[[id number]]          Abnormal            Final result                 Please view results for these tests on the individual orders.    LIPID PANEL   HEMOGLOBIN A1C   PROTHROMBIN TIME (PT)   RAINBOW DRAW BLUE   RAINBOW DRAW LAVENDER   RAINBOW ROSEMARY OZIEL , CT BRAIN OR HEAD (77922), 3/21/2018, 11:38. INDICATIONS:  syncope/ fall  TECHNIQUE:  Noncontrast CT scanning of the cervical spine is performed from the skull base through C7. Multiplanar reconstructions are generated.   Dose reduction techniques also be due to ligamentous hypertrophy. MRI may be helpful.     Dictated by: Rufus Hatchet, MD on 3/21/2018 at 12:35     Approved by: Rufus Hatchet, MD                     Xr Ribs With Chest (3 Views), Left  (cpt=71101)    Result Date: 3/21/2018  C evidence of any rib fractures and does show evidence of lytic lesions in the left shoulder but no evidence of any acute fracture.   Patient underwent CT scan of the brain which shows evidence of new multiple hemorrhagic metastatic lesions noted within the b collapse R55 3/21/2018 Unknown    Anemia D64.9 3/21/2018 Yes    Brain metastases (HCC) C79.31 3/21/2018     Elevated LFTs R79.89 3/21/2018     Nontraumatic subcortical hemorrhage of cerebral hemisphere ([facility] [address] I61.0 3/21/2018 Yes

## 2018-03-21 NOTE — CONSULTS
[facility]  Neurocritical Care       Name: Júnior Tierney  MRN: [id number]  Admission Date/Time: 3/21/2018 10:01 AM  Primary Care Provider:  Adolph Felix MD        Reason for Consultation:   Multiple areas of ICH with h/p colon mets [facility])      Colovesical fistula         Date Noted: 08/20/2014      Sepsis(995.91)      Urinary tract infection      Fever         Date Noted: 08/18/2014      Fever and neutropenia [facility])         Date Noted: 07/30/2014      Health education/counseling Phosphate (DECADRON) 4 MG/ML injection 4 mg 4 mg Intravenous Q6H         Review of Systems    Constitutional:    Denies unusual weight loss or weight gain, fever/chills or night sweats.   HEENT:                Denies changes in vision or difficulty swallowi obtained. COMPARISON:  [place], CT CHEST+ABDOMEN+PELVIS(ALL CNTRST ONLY)(CPT=71260/05494), 3/03/2018, 11:19. INDICATIONS:  syncope/ fall  PATIENT STATED HISTORY: (As transcribed by Technologist)  Patient states he fell this morning onto his left side. hemorrhagic mass in the mid to left side of the gunnar measures 1.2 x 1.4 cm in the axial plane and 1.4 cm cranio caudally. Be bb there is associated vasogenic edema in the gunnar. Beam B  No bony destructive lesions are identified.   Paranasal sinuses, masto changes are noted. No evidence of fracture or dislocation. Prevertebral soft tissues are within normal limits. Predental space is normal. C2-C3:  There is epidural high density at the region of the foramen magnum extending to the C2-C3 level.   [facility]) NRDR ([address]) which  includes the Dose Index Registry.   PATIENT STATED HISTORY:(As transcribed by Technologist)  Patient complains of left shoulder pain, unable to raise arm for exam. History of sepsis, colon cancer and kidneys with a small low-attenuation nodule probably cyst upper pole left kidney. Increasing size of left adrenal mass 3.0 x 3.5 cm. Stable right adrenal gland. Atrophy of the pancreas. The pancreas overall is poorly seen.   There are atherosclerotic ch swelling and limited range of motion with left shoulder. CONCLUSION:  Heart size upper limits of normal.  Pulmonary vascularity upper limits of normal.  No focal pulmonary infiltrate or consolidation.   There is some linear atelectasis in the left l stable. AORTA/VASCULAR:  No aneurysm or dissection. RETROPERITONEUM:  Mildly prominent periaortic lymph nodes are stable. BOWEL/MESENTERY:  Small amount of free fluid is noted. Postsurgical changes involving the bowel is noted.   No evidence of bowel obst 03/21/2018   TROP <0.046 03/21/2018     Recent Labs   Lab  03/21/18   1016   GLU  90   BUN  10   CREATSERUM  0.53*   GFRAA  127   GFRNAA  110   CA  8.9   ALB  2.5*   NA  136   K  4.0   CL  102   CO2  26.0   ALKPHO  917*   AST  150*   ALT  49   BILT  4.3* and gave them an update. G: Choi no, [place] no    Goals of the Day: -150, use cardene gtt if req, ICH Protocol, Keppra and EEG.     A total of 35 minutes of critical care time (exclusive of billable procedures) was administered to manage an

## 2018-03-21 NOTE — PLAN OF CARE
Assumed care of pt from ED at [facility] 81.. Pt A&Ox4. Neurologically intact. Able to walk with x1 assistance. Pt complaining of chronic pain in L shoulder. Denies headache, lightheaded, or dizziness. POA updated on pt and plan of care.   Dr. Hayden Dinero notified that

## 2018-03-21 NOTE — ED INITIAL ASSESSMENT (HPI)
Patient comes to ED via EMS with syncope/ fall at 0300. Pt c/o neck pain and left shoulder pain. Denies CP, denies dizziness.

## 2018-03-21 NOTE — H&P
[facility]  History and Physical     Wilnette Guardian Patient Status:  Emergency    10/22/1950 MRN [id number]   Location [address] Attending Carmen Ford MD   Hosp Day # 0 PCP Rosmery Terrazas MD     Chief Complaint: 60 tablet Rfl: 2   Rivaroxaban (XARELTO) 20 MG Oral Tab Take 1 tablet (20 mg total) by mouth daily with food. Disp: 26 tablet Rfl: 11       Review of Systems:   A comprehensive 14 point review of systems was completed.     Pertinent positives and negatives prognosis  3. On opdivo  2. Multiple hemorrhagic masses with surrounding vasogenic edema  1. Decadron  2. Hold xarelto  3. Neuro Sx and Neurocritical care on consult  4. Seizure prophylaxis with keppra  3. Elevated LFTs  1. Due to mets  4.  Normocytic anemi

## 2018-03-22 PROBLEM — Z71.89 GOALS OF CARE, COUNSELING/DISCUSSION: Status: ACTIVE | Noted: 2018-01-01

## 2018-03-22 PROBLEM — Z51.5 PALLIATIVE CARE ENCOUNTER: Status: ACTIVE | Noted: 2018-01-01

## 2018-03-22 NOTE — CONSULTS
[facility]    Report of Consultation    Martell Ely Patient Status:  Inpatient    10/22/1950 MRN [id number]   [facility] 6NE-A Attending Clark Mclaughlin MD   Hosp Day # 0 PCP Leanne Arciniega MD     Date of Admission:  3/21/2018  [place] infusion reaction and Solucortef was added to the premedications.     When progression was noted, he initiated Opdivo on a compassionate use basis. History of Present Illness:  Juventino Lassiter is a a(n) 78year old male with the above oncology history. **OR** acetaminophen (TYLENOL) 650 MG rectal suppository 650 mg, 650 mg, Rectal, Q4H PRN  •  morphINE sulfate (PF) 4 MG/ML injection 1 mg, 1 mg, Intravenous, Q2H PRN **OR** morphINE sulfate (PF) 4 MG/ML injection 2 mg, 2 mg, Intravenous, Q2H PRN  •  Labeta (178 lb), SpO2 98 %. General: Patient is alert and oriented x 3, not in acute distress.    Vital Signs: /73   Pulse 85   Temp 98.4 °F (36.9 °C) (Temporal)   Resp 26   Ht 1.753 m (5' 9\")   Wt 80.7 kg (178 lb)   SpO2 98%   BMI 26.29 kg/m²    HEENT: E caudal aspect of a large left lateral chest wall and shoulder mass is noted. The minimal caudal partial visualized portion measures 16.0 x 11.4 cm. Left-sided ostomy is noted. Multiple calculi in the bladder are again noted.      No definite eviden do.  He has been followed by palliative care as an outpatient, and we discussed involving them as an inpatient, as well. He lives alone, which is not likely a reasonable plan any further. He was not open to that suggestion.  I broached the topic of hospic

## 2018-03-22 NOTE — OCCUPATIONAL THERAPY NOTE
OCCUPATIONAL THERAPY EVALUATION - INPATIENT     Room Number: 6516/3092-K  Evaluation Date: 3/22/2018  Type of Evaluation: Initial  Presenting Problem: Brain Mets from Colon Cancer    Physician Order: IP Consult to Occupational Therapy  Reason for Therapy: Equipment: Tub-shower combo       Occupation/Status: retired  Hand Dominance: Right  Drives: Yes  Patient Regularly Uses: Glasses    Prior Level of Function: Pt was mod I level at home w/functional mobility and ADLs. Pt no longer drives and is retired.  Pt shortness of breath    ACTIVITIES OF DAILY LIVING ASSESSMENT  AM-PAC ‘6-Clicks’ Inpatient Daily Activity Short Form  How much help from another person does the patient currently need…  -   Putting on and taking off regular lower body clothing?: A Lot  - tolerance, vision.  Pt with one lazy eye since birth and with one eye that sees far and one that sees close, pt stating this was working for him until recently when it has gotten harder to switch between close and far for ADLs and IADLs, pt also with min vi all functional transfers:  with supervision    UE Exercise Program Goal  Patient will be supervision with bilateral AROM HEP within range(home exercise program).     Additional Goals:  Pt will verbalize at least 3 energy conservation techniques  Pt will sta

## 2018-03-22 NOTE — PHYSICAL THERAPY NOTE
PHYSICAL THERAPY EVALUATION - INPATIENT     Room Number: 2967/9821-M  Evaluation Date: 3/22/2018  Type of Evaluation: Initial  Physician Order: PT Eval and Treat    Presenting Problem: CT - multiple brain bleeds and edema  Reason for Therapy: Zhanna Corona walker  Patient Regularly Uses: Glasses    Prior Level of [place]: Pt was mod I level at home w/functional mobility and ADLs. Pt no longer drives and is retired. Pt enjoys gardening.      SUBJECTIVE  \"I just got back to bed but I can work for a little Little   -   Moving from lying on back to sitting on the side of the bed?: A Little   How much help from another person does the patient currently need. ..   -   Moving to and from a bed to a chair (including a wheelchair)?: A Little   -   Need to walk in h metastatic colorectal cancer (diagnosed in 2014). In 2015 pt developed LLE DVT. In 2016, pt noted mass in L shoulder - biopsy confirmed metastatic colon cancer and underwent palliative RT.   April 2017, CT revealed progression of disease and multiple small level: supervision     Goal #2 Patient is able to demonstrate transfers Sit to/from Stand at assistance level: supervision     Goal #3 Patient is able to ambulate 200 feet with assist device: walker - rolling at assistance level: minimum assistance     [facility]

## 2018-03-22 NOTE — CM/SW NOTE
03/22/18 1500   CM/SW Referral Data   Referral Source Social Work (self-referral)   Reason for Referral [facility] Consulted)   Informant Patient   Patient Info   Advanced directives?  Yes   Patient's Mental Status Alert;Oriented   Patient's [address]

## 2018-03-22 NOTE — PROGRESS NOTES
[facility]  Progress Note     Ana Portal Patient Status:  Inpatient    10/22/1950 MRN [id number]   [facility] 6NE-A Attending Fidencio Quinn MD   Hosp Day # 1 PCP Jo Ann Balbuena MD     Chief Complaint: fall    S: Patient has no docusate sodium  100 mg Oral BID   • famoTIDine  20 mg Oral Daily    Or   • famoTIDine  20 mg Intravenous Daily   • atorvastatin  40 mg Oral Nightly   • AmLODIPine Besylate  5 mg Oral Daily   • dexamethasone Sodium Phosphate  4 mg Intravenous Q6H       ASS

## 2018-03-22 NOTE — PROGRESS NOTES
03/22/18 1023   Clinical Encounter Type   Visited With Patient   Routine Visit (Responded to the consult)   Continue Visiting (Encouraged pt to call  as needed/requested. )   Patient's Supportive Strategies/Resources Identified his family suppor

## 2018-03-22 NOTE — PROGRESS NOTES
[facility]  Neurocritical Care       Subjective: Regine Gilliland is a(n) 78year old male with multiple brain mets with hemorrhagic transformation had a syncopal episode, no more since being in hospital.    Review of Systems    Constitu to pain. #12:Tongue is midline.          Motor: Tone normal and symmetric. LUE 3/5 limite ddue to pain, LLE 3-4/5, rest 4/5  Sensory: Normal and symmetric to light touch. Gait: Deferred.     Diagnostics:   Xr Shoulder, Complete (min 2 Views), Left (cpt=7 cm cranio caudally. There is moderate surrounding vasogenic edema. Bb there is moderate vasogenic edema in the right occipital lobe. Bb bb this extends into the right parietal lobe. Mass in this region is difficult delineate.   More caudal to this in t Registry. PATIENT STATED HISTORY: (As transcribed by Technologist)  Patient states he has a history of colon cancer. He states he is in a lot of pain and thinks he fell yesterday. FINDINGS: Unremarkable alignment of the cervical spine.  Vertebral body h 11/09/2017, 14:41.   INDICATIONS:  C78.7 Secondary malignant neoplasm of liver and intrahepatic bile duct C18.9 Malignant neoplasm of colon, unspecified C78.7 Secondary malignant neoplasm of neyda*  TECHNIQUE:  IV contrast-enhanced scanning through the chest, Enlarged left hilar lymph node 18 mm is new. 10 mm right hilar lymph node is probably stable. Small mediastinal lymph nodes are present.    Widespread hepatic metastatic disease has markedly worsened, with numerous masses and nodules increase in both size of the ribs were obtained  COMPARISON:  [facility] , CT SPINE CERVICAL (CPT=72125), 3/21/2018, 11:40. [facility] , XR SHOULDER, COMPLETE (MIN 2 VIEWS), LEFT (CPT=73030), 3/21/2018, 12:30.   INDICATIONS:  syncope/ fall  PATIENT STATED HISTORY: (As transcribed by Te yesterday. CONTRAST USED:  100cc of Omnipaque 350  FINDINGS:  LIVER:  Large innumerable metastatic deposits throughout the liver are again identified. BILIARY:  No visible dilatation or calcification. PANCREAS:  The pancreas is mildly hypoplastic.  SPLEE 8.2 03/22/2018   HCT 23.2 03/22/2018   .0 03/22/2018   CREATSERUM 0.50 03/22/2018   CREATSERUM 0.50 03/22/2018   BUN 14 03/22/2018   BUN 14 03/22/2018    03/22/2018    03/22/2018   K 4.2 03/22/2018   K 4.2 03/22/2018    03/22/2018 (ANTONETTE-SYNEPHRINE) 50 mg/250 ml premix infusion SOLN 100-200 mcg/min Intravenous Continuous PRN   Senna (SENOKOT) tab TABS 17.2 mg 17.2 mg Oral Nightly   docusate sodium (COLACE) cap 100 mg 100 mg Oral BID   PEG 3350 (MIRALAX) powder packet 17 g 17 g Oral Da prophylaxis - Keppra 500mg IV BID and EEG - slowing.  - Hold all antiplatelets and anticoagulants as of now. - CT Brain - as above  - Neurosurgery and Oncology Consulted. - Daily CBC, BMP and Mg.  LFT's Q3 days  - Decadron 4mg IV q6hrs    Cardiac:  · Sys

## 2018-03-22 NOTE — CONSULTS
[address] J[address] Faith Initial Consult      [facility]  [id number]  Hospital Day #1  Date of Consult: 03/22/18       Reason for Consultation: Consult requested for evaluation of palliative care needs and goals of care discussion. Healthcare Directive: Durable power of  for health care; Health care treatment directive  Healthcare Agent Appointed: Yes  Healthcare Agent's Name: [address] [place] [address] Agent's Phone Number: [phone]  Describe Patient Wishes: DNR    Pj Dillard

## 2018-03-22 NOTE — PLAN OF CARE
Assumed patient care this am. Patient alert, oriented x4. Neurologically intact. Patient has left shoulder mass and pain in the shoulder that limits range of motion. Denies pain at this time. Up to chair with assist x1, tolerated well.  Ambulated in [place]

## 2018-03-22 NOTE — SLP NOTE
ADULT SWALLOWING EVALUATION    ASSESSMENT    ASSESSMENT/OVERALL IMPRESSION:  Patient seen for swallowing evaluation per stroke protocol. He was admitted after a fall at home and not feeling well, called EMS who brought the patient to ED.   CT of the brain fistula    • Colon cancer metastasized to liver [facility])    • Unspecified essential hypertension        Prior Living Situation: Home alone  Diet Prior to Admission: Regular; Thin liquids  Precautions: Seizure    Patient/Family Goals:  To return home    SWALLOWI aspiration.)    Esophageal Phase of Swallow: No complaints consistent with possible esophageal involvement            GOALS  Goal #1 Patient will participate in communication evaluation  In Progress     FOLLOW UP  Treatment Plan/Recommendations: Communicat

## 2018-03-22 NOTE — PROGRESS NOTES
[facility]    Progress Note    Regine Gilliland Patient Status:  Inpatient    10/22/1950 MRN [id number]   [facility] 6NE-A Attending Maik Bee MD   [facility] Day # 1 PCP Jayden Zamudio MD     Subjective:  Regine Gilliland is a(n) 15 yea education/counseling     Fever and neutropenia (HCC)     Fever     Sepsis(995.91)     Urinary tract infection     Colovesical fistula     Colon cancer ([facility] [address]     Sepsis ([facility] [address]     UTI (lower urinary tract infection)     Acute kidney injury ([facility] [address]     Adelita Nelson

## 2018-03-23 NOTE — SLP NOTE
SPEECH/LANGUAGE/COGNITIVE EVALUATION - INPATIENT    Admission Date: 3/21/2018  Evaluation Date: 03/23/18    Reason for Referral: Stroke protocol    ASSESSMENT & PLAN   ASSESSMENT & IMPRESSION  Patient seen for communication evaluation per stroke protocol. Read back was performed.               Dictated by: William Sawyer MD on 3/21/2018 at 12:30       Approved by: William Sawyer MD              Patient/Family Goals:  To return home    Interdisciplinary Communication: Discussed with physician    Olivia

## 2018-03-23 NOTE — PHYSICAL THERAPY NOTE
PHYSICAL THERAPY TREATMENT NOTE - INPATIENT    Room Number: 424/424-A     Session: 1   Number of Visits to Meet Established Goals: 5    Presenting Problem: CT - multiple brain bleeds and edema    History related to current admission: Pt is a 78 yr old mal shoulder       BALANCE                                                                                                                     Static Sitting: Fair +  Dynamic Sitting: Fair +           Static Standing: Not tested  Dynamic Standing: Not tested training, active flexibility and BLE strengthening, due to [facility] admission for brain mets , colon cancer. Results of the AM-PAC \"6 clicks\" Inpatient Daily Mobility Short Form for the patient is 50.57% degree of basic mobility impairment.   Re

## 2018-03-23 NOTE — PROGRESS NOTES
Spoke with patient briefly prior to imminent transfer to SNF. Hx of metastatic colon cancer, s/p therapy under Dr. Joanna Yao. About a 4 year course of illness. Now out of systemic therapy options.   Noncontrast head CT reveals 3 masses consistent with

## 2018-03-23 NOTE — OCCUPATIONAL THERAPY NOTE
OCCUPATIONAL THERAPY TREATMENT NOTE - INPATIENT     Room Number: 424/424-A  Session: 1   Number of Visits to Meet Established Goals: 5    Presenting Problem: Brain Mets from Colon Cancer    History related to current admission: Pt is a 78 yr old male admit LEs  Management Techniques: Activity promotion; Body mechanics;Breathing techniques;Relaxation;Repositioning     ACTIVITY TOLERANCE  No shortness of breath    ACTIVITIES OF DAILY LIVING ASSESSMENT  AM-PAC ‘6-Clicks’ Inpatient Daily Activity Short Form  How techniques; Visual perceptual training;ADL training;IADL training;Continued evaluation; Compensatory technique education; Neuromuscluar reeducation;Equipment eval/education;Patient/Family training;Patient/Family education; Endurance training;UE strengthening/R

## 2018-03-23 NOTE — PROGRESS NOTES
Alert,oriented>Patient complains of pain once this shift. MS Contin given with good relief. Decadron IV per MD orders. IV fluids ongoing.

## 2018-03-23 NOTE — PLAN OF CARE
Problem: Impaired Cognition  Goal: Patient will exhibit improved attention, thought processing and/or memory  Interventions:  - Minimize distractions in the room when full attention is required  - Allow additional time for processing after asking questions

## 2018-03-23 NOTE — PROGRESS NOTES
[facility]    Progress Note    Kathleen Will Patient Status:  Inpatient    10/22/1950 MRN [id number]   [facility] 6NE-A Attending Luis Luna MD   [facility] Day # 2 PCP Viivan Toure MD     Subjective:  Kathleen Will is a(n) 78 yea ([facility] [address]     Malignant neoplasm of colon ([facility] [address]     HTN, goal below 130/80     Bone metastases ([facility] [address]     Palliative care by specialist     Anemia     Syncope and collapse     Nontraumatic subcortical hemorrhage of cerebral hemisphere [facility])     Brain metastases

## 2018-03-23 NOTE — PROGRESS NOTES
[id number] Neurology Progress Note    Esther Aldo Patient Status:  Inpatient    10/22/1950 MRN [id number]   [facility] 4NW-A Attending Autumn Noguera MD   Hosp Day # 2 PCP Shae Powers MD     Subjective:  Esthermartín Carlson is a 03/23/2018   BUN 21 03/23/2018    03/23/2018   K 4.1 03/23/2018    03/23/2018   CO2 23.0 03/23/2018    03/23/2018   CA 7.9 03/23/2018   PTT 34.0 03/23/2018   MG 2.2 03/23/2018   PGLU 160 03/23/2018     Imaging:  [facility] 3/21/18  CONCLUSION: treatment plan, I agreed above note,   I have personally updated assessment and treatment to family at bedside in details, all questions were answered to satisfactory.        Lamin Cheatham  Neurologist and Neuromuscular specialist  Board certified neuro- elec

## 2018-03-23 NOTE — PALLIATIVE CARE NOTE
1808 Jovanny Faith Follow Up      Tk Henning  [id number]       Patient seen and evaluated, no family at bedside.      ROS: denies complaints     Physical Exam:  GEN:  NAD  Neuro:  Awake and alert, oriented x3  Respiratory:  Respir

## 2018-03-23 NOTE — PROGRESS NOTES
[facility]  Progress Note     James Noyola Patient Status:  Inpatient    10/22/1950 MRN [id number]   [facility] 6NE-A Attending Viv Angelo MD   Hosp Day # 2 PCP Adolph Burnham MD     Chief Complaint: fall    S: Patient has no 1016   TROP  <0.046            Imaging: Imaging data reviewed in Epic.     Medications:   • levETIRAcetam  500 mg Oral BID   • Senna  17.2 mg Oral Nightly   • docusate sodium  100 mg Oral BID   • famoTIDine  20 mg Oral Daily    Or   • famoTIDine  20 mg Intr

## 2018-03-24 NOTE — PLAN OF CARE
Assumed care @ 0730. Patient alert, oriented to person,place and situation. Patient denies discomfort, no seizure activity noted. Patient's vital signs stable.

## 2018-03-24 NOTE — PROGRESS NOTES
[facility]  Progress Note     Remy Stubbs Patient Status:  Inpatient    10/22/1950 MRN [id number]   [facility] 6NE-A Attending Samina Celis MD   Hosp Day # 3 PCP Jamil Conrad MD     Chief Complaint: fall    S: Patient has no Imaging: Imaging data reviewed in Epic.     Medications:   • dexamethasone Sodium Phosphate  4 mg Intravenous Q12H   • Insulin Aspart Pen  1-10 Units Subcutaneous TID AC and HS   • levETIRAcetam  500 mg Oral BID   • Senna  17.2 mg Oral Nightly   • docus

## 2018-03-24 NOTE — PROGRESS NOTES
[address] Sarah Kingston Neurology Progress Note    Roro Number Patient Status:  Inpatient    10/22/1950 MRN [id number]   [facility] 4NW-A Attending Juana Marquez MD   Hosp Day # 3 PCP Kelsey Diaz MD     CC: Syncope    Subjective:  Alva Mejia epidural hemorrhage in this location. 3/21/2018 CT Brain  Multiple hemorrhagic masses, highly suspicious for hemorrhagic metastatic deposits. Largest is in the posterior left frontal lobe extending towards the left parietal lobe.   There is significant and Neuromuscular specialist  Board certified neuro- [facility]

## 2018-03-24 NOTE — PLAN OF CARE
Impaired Cognition    • Patient will exhibit improved attention, thought processing and/or memory Progressing        Impaired Swallowing    • Minimize aspiration risk Progressing        NEUROLOGICAL - ADULT    • Achieves stable or improved neurological sta

## 2018-03-24 NOTE — PROGRESS NOTES
[facility]    Progress Note    Corina Beltran Patient Status:  Inpatient    10/22/1950 MRN [id number]   [facility] 6NE-A Attending Cassie Chaudhary MD   [facility] Day # 3 PCP Deni Lowe MD     Subjective:  Corina Beltran is a(n 78 yea acute cor pulmonale (HCC)     Other acute pulmonary embolism without acute cor pulmonale (HCC)     Malignant neoplasm of colon (HCC)     HTN, goal below 130/80     Bone metastases ([address]     Palliative care by specialist     Anemia     Syncope and collapse

## 2018-03-25 NOTE — PROGRESS NOTES
[facility]    Progress Note    Remy Stubbs Patient Status:  Inpatient    10/22/1950 MRN [id number]   [facility] 6NE-A Attending Samina Celis MD   [facility] Day # 4 PCP Ananth Laws MD     Subjective:  Remy Stubbs is a(n) 78 yea Palliative care by specialist     Anemia     Syncope and collapse     Nontraumatic subcortical hemorrhage of cerebral hemisphere [facility])     Brain metastases (HCC)     Elevated LFTs     Counseling regarding end of life decision making     Metastatic colon ca

## 2018-03-25 NOTE — PLAN OF CARE
1887 handoff. Patient is awake. Confused to place and time. Does know his sister is coming in \" to make decisions\" IV fluids. Colostomy putting out soft yellow stool. Has been awake most of night. Talking to self.  Wanting breakfast. Reoriented prn to sepideh

## 2018-03-25 NOTE — PROGRESS NOTES
[facility]  Progress Note     Jethro Barrier Patient Status:  Inpatient    10/22/1950 MRN [id number]   [facility] 6NE-A Attending Rsahad López MD   [facility] Day # 4 PCP Ebonie Young MD     Chief Complaint: fall    S: Patient sleepy Intravenous Q8H   • Insulin Aspart Pen  1-10 Units Subcutaneous TID AC and HS   • levETIRAcetam  500 mg Oral BID   • Senna  17.2 mg Oral Nightly   • docusate sodium  100 mg Oral BID   • famoTIDine  20 mg Oral Daily    Or   • famoTIDine  20 mg Intravenous D

## 2018-03-25 NOTE — PLAN OF CARE
Assumed care @ 0730. Patient alert. Oriented to person and place. Patient forgetful @ times. Patient fable to follow commands. Patient's vital signs stable. Patient denies pain.

## 2018-03-25 NOTE — PROGRESS NOTES
Alert, oriented x 2-3, no c/o pain or signs of distress, continues on IVF, continues on decadron,accu checks QID, parameters not met tonight, pt remains comfortable, continue to monitor.

## 2018-03-26 NOTE — PROGRESS NOTES
[facility]  Progress Note     Benigno Carrillo Patient Status:  Inpatient    10/22/1950 MRN [id number]   [facility] 6NE-A Attending Dorian Lewis MD   [facility] Day # 5 PCP Niharika Liriano MD     Chief Complaint: fall    S: Patient awake a • Insulin Aspart Pen  1-10 Units Subcutaneous TID AC and HS   • levETIRAcetam  500 mg Oral BID   • Senna  17.2 mg Oral Nightly   • docusate sodium  100 mg Oral BID   • famoTIDine  20 mg Oral Daily    Or   • famoTIDine  20 mg Intravenous Daily   • atorvas

## 2018-03-26 NOTE — PALLIATIVE CARE NOTE
QUETA DRAKE/Ingris advised PCSW that pt is currently confused. QUETA Sarkar states she has a call out to patients HCPOA/Sister/Renetta regarding goals of care; JEISON vs Hospice.  QUETA Sarkar will notify PCSW when goals of care are established, PCSW will follow

## 2018-03-26 NOTE — PROGRESS NOTES
[facility]    Progress Note    Ana Portal Patient Status:  Inpatient    10/22/1950 MRN [id number]   [facility] 6NE-A Attending Fidencio Quinn MD   [facility] Day # 5 PCP Jo Ann Balbuena MD     Subjective:  Ana Portal is a(n) 78 yea infection)     Acute kidney injury ([address]     Dehydration     Cancer of rectosigmoid (colon) ([address]     DVT, lower extremity, proximal ([address]     Hypertension due to drug     Hypomagnesemia     Pulmonary embolism without acute cor pulmonale (HCC)     Other ac

## 2018-03-26 NOTE — CONSULTS
[facility] Pharmacy Note:  Renal Adjustment for sulfamethoxazole-trimethoprim (BACTRIM)    Gail Truong is a 78year old male who has been prescribed sulfamethoxazole-trimethoprim (BACTRIM) 400/80 mg every 12 hrs.   CrCl is estimated creatinine clearance is 169

## 2018-03-26 NOTE — CONSULTS
[facility] Pharmacy Note:  Renal Adjustment for sulfamethoxazole-trimethoprim (BACTRIM)    Ptael Alonso is a 78year old male who has been prescribed sulfamethoxazole-trimethoprim (BACTRIM) 400/80 mg every 12 hrs.   CrCl is estimated creatinine clearance is 169

## 2018-03-26 NOTE — PROGRESS NOTES
03/26/18 1648   Clinical Encounter Type   Continue Visiting ( to remain available at pager [phone].)   Referral To ( scanned and filed POLST form, dated 4/2014, into patient's electronic record. )

## 2018-03-26 NOTE — PROGRESS NOTES
Pt alert,oriented to person,place,disoriented to time. IVF infusing via PAC. PAC no bld return noted. Has colostomy with lots of gas. BLE with 2+-3+  pitting edema and elevated on pillows. No c/o pain. Seizure and safety precaution maintained.

## 2018-03-26 NOTE — PALLIATIVE CARE NOTE
1808 Jovanny Faith Follow Up      Romana Curling  [id number]       Patient seen and evaluated, family/sister at bedside.      ROS: denies complaints    Physical Exam:  GEN:  NAD  Neuro:  Awake and alert, oriented x3  Respiratory:  Res

## 2018-03-26 NOTE — PALLIATIVE CARE NOTE
PCSW notified by Unit LIAN/Iveth that Dr. Joanna Yao was recommending pt go to [facility] for JEISON as pt will need to follow-up at [facility] for radiation. PCSW met with pt and pts sister/Renetta who are agreeable to the above plan.  PCSW sent ECIN to Claudetta Maxcy

## 2018-03-26 NOTE — PLAN OF CARE
Impaired Activities of Daily Living    • Achieve highest/safest level of independence in self care Not Progressing          Impaired Cognition    • Patient will exhibit improved attention, thought processing and/or memory Progressing        Impaired Swallo

## 2018-03-27 NOTE — PROGRESS NOTES
[facility]    Progress Note    Gail List Patient Status:  Inpatient    10/22/1950 MRN [id number]   [facility] 6NE-A Attending Jonny Alberts MD   [facility] Day # 6 PCP Jina Morgan MD     Subjective:  Gail List is a(n 78 yea metastases ([facility] [address]     Elevated LFTs     Counseling regarding end of life decision making     Metastatic colon cancer to liver [facility])     Palliative care encounter     Goals of care, counseling/discussion      Metastatic colon cancer with brain, liver, and julio

## 2018-03-27 NOTE — TELEPHONE ENCOUNTER
TC to RN [facility] [id number], [address] [facility] to confirm pending d/c to [facility] & need to arrange outpatient consultation appointment for radiation planning. Offered a 9:00 am appointment with Dr. Ifrah Potter for tomorrow 3/28.  Per  request I also contacted [place] at [place]

## 2018-03-27 NOTE — PLAN OF CARE
Diabetes/Glucose Control    • Glucose maintained within prescribed range Adequate for Discharge        Impaired Activities of Daily Living    • Achieve highest/safest level of independence in self care Adequate for Discharge        Impaired Cognition    •

## 2018-03-27 NOTE — PROGRESS NOTES
03/27/18 0835   Clinical Encounter Type   Visited With Patient   Continue Visiting ( to remain available at pager [phone].)   Sacramental Encounters   Sacrament of Sick-Anointing Anointed  (Father Jeremy Sun provided prayer, Scripture, support and [employer]

## 2018-03-27 NOTE — PALLIATIVE CARE NOTE
PCSW was contacted by DREA/ Yue Christianson [phone] stating they can accept pt today and she will call back shortly with a time.      DREA/Zulema states they will arrange transportation to and from radiation for pt; she inquired about pts radiation schedule

## 2018-03-27 NOTE — PLAN OF CARE
Pt alert and oriented x2. Pt sister aware of POC for transfer to [facility] for tomorrow. VSS and afebrile. Fall precautions in place. Pt changed and repositioned through the night. Up in chair during the night. Call light within reach.   Will continu

## 2018-03-28 NOTE — PATIENT INSTRUCTIONS
- CT SIMULATION SCHEDULED FOR March MARCH 29 AT 9:30 AM.   PT will be coming to the outpatient Cancer at [facility].  Johnny, [address], [address] [facility], PLEASE CALL  [phone]) and ask

## 2018-03-28 NOTE — PROGRESS NOTES
[facility]  Progress Note     El Birmingham Patient Status:  Inpatient    10/22/1950 MRN [id number]   [facility] 6NE-A Attending Shani Leong MD   [facility] Day # 6 PCP Uyen Green MD     Chief Complaint: fall    S: no complaints mets  4. Normocytic anemia  5. LLE DVT/ PE- was on chronic xarelto , now dced due to IC hem masses and no longer a candidate for anticoagulation   6. UTI  1.  Started on bactrim BID for 7 days          Plan of care: Dc planning with palliative care  Today t

## 2018-03-28 NOTE — TELEPHONE ENCOUNTER
FYI -- Pt fell this morning at [facility] in [address] [facility]. No injury.

## 2018-03-28 NOTE — CONSULTS
[address]    PATIENT'S NAME: Pippa Alexandra   RADIATION ONCOLOGIST: Marna Fleischer. Idalmis Judd M.D.    [facility]   MEDICAL RECORD #: [id number] YOB: 1950   CONSULTATION DATE: 03/28/2018       RADIATI answer questions succinctly and correctly. He complains of significant pain at the current time in his shoulder with a current pain score of 10/10 by report.     PAST MEDICAL HISTORY:  Past history of colon cancer as above as well as hypertension and a col metastatic lesions in the brain which have caused hemorrhage. For that reason, he was referred to Radiation Oncology to consider palliative treatment. RECOMMENDATIONS:  I do believe the patient is a good candidate for palliative treatment.   However, I radiotherapy, please feel free to contact me at any time. Dictated By Marco Grimaldo M.D.  d: 03/28/2018 10:49:58  t: 03/28/2018 11:13:16  [facility] [id number]  NAD/    cc: MARY Leonard M.D.

## 2018-03-28 NOTE — PROGRESS NOTES
Nursing Consultation Note  Patient: Court Ahuja  YOB: 1950  Age: 78year old  Radiation Oncologist: Dr. Mena Choe  Referring Physician: [email or web address]  Consult Date: 3/28/2018      Chemotherapy: YES  Labs Negative. Neurological: Positive for syncope and weakness. Hematological: Negative. Psychiatric/Behavioral: Positive for agitation and confusion.        Allergies:    Oxaliplatin             Itching    Comment:Red hands, itching, heart racing, shake Packs/day  For 30.00 Years     Quit date: 3/21/2016    Smokeless tobacco: Never Used    Alcohol use No    Drug use: No    Sexual activity: Not on file     Other Topics Concern   None on file     Social History Narrative   None on file       ECOG:  Grade 2

## 2018-03-28 NOTE — PROGRESS NOTES
Shawn Bolanos  : 10/22/1950  Age 78year old  male patient is admitted to Facility: [place] [address] for JEISON s/p fall/colon CA w/ brain mets.     [address] [place] [address] Drive date:    3.21.18  Discharge date to [facility]:    3.27.18  ELOS:    14 days  Antic COLOSTOMY      Comment: stoma  No date: PORT, INDWELLING, IMP  Family History   Problem Relation Age of Onset   • Adopted: Yes     Smoking status: Former Smoker                                                              Packs/day: 1.00      Years: 30.00 congestion, sinus pain or sore throat; and hearing loss negative  RESPIRATORY: denies shortness of breath, wheezing or cough   CARDIOVASCULAR:denies chest pain, no palpitations , denies syncope, denies orthopnea, denies cough  GI: denies nausea, vomiting, infiltrative mass limiting ROM and causing weakness  EXTREMITIES/VASCULAR:no cyanosis, clubbing or edema, radial pulses 2+ and dorsalis pedal pulses 2+. +clubbing; 2+ pitting edema to pretibial region b/l.   NEUROLOGIC: intact; no sensorimotor deficit, cra daily    HTN/HL  1. VS q shift  2. JAYJAY diet  3. Amlodipine 5 mg daily  4. Atorvastatin 40 mg q HS  5. TEDs on in am and off at HS    LLE DVT/PE  1. No longer candidate for anticoagulation    At risk for skin breakdown/hypoalbuminemia  1.  Low airloss air ma

## 2018-03-28 NOTE — DISCHARGE SUMMARY
[facility] HOSPITALIST  DISCHARGE SUMMARY     Rafia Ely Patient Status:  Inpatient    10/22/1950 MRN [id number]   [facility] 4NW-A Attending No att. providers found   Hosp Day # 6 PCP Jo Ann Balbuena MD     Date of Admission: 3/21/2018  Date options. He was seen by oncology. He is found to have multiple hemorrhagic masses on admission with surrounding vasogenic edema. He was placed on Decadron and seizure prophylaxis with Keppra.   He was previously on Xarelto for left lower extremity DVT an Sulfamethoxazole-TMP -160 MG Tabs per tablet  Commonly known as:  BACTRIM DS      Take 1 tablet by mouth every 12 (twelve) hours.    Stop taking on:  4/2/2018  Quantity:  13 tablet  Refills:  0        CONTINUE taking these medications      Instructi deficits. Musculoskeletal: Moves all extremities. Extremities: No edema.   -----------------------------------------------------------------------------------------------  PATIENT DISCHARGE INSTRUCTIONS: See electronic chart    Gracy Coronel MD 3/27/201

## 2018-03-30 NOTE — PROGRESS NOTES
Shawn Bolanos  : 10/22/1950  Age 78year old  male      CC--Patient presents with:  Nursing Home: admitted [facility] for subacute rehab, colon ca with brain mets       H. P.I Shawn Bolanos is a 78year old male who was admitted to [address]  On (four) times daily. Disp:  Rfl:    morphINE Sulfate ER 15 MG Oral Tab CR Take 1 tablet (15 mg total) by mouth every 12 (twelve) hours. Disp: 10 tablet Rfl: 0   atorvastatin 40 MG Oral Tab Take 1 tablet (40 mg total) by mouth nightly.  Disp: 20 tablet Rfl: 0 denies food or seasonal allergies    VITALS:  /72   Pulse 80   Temp 98.3 °F (36.8 °C)   Resp 20   SpO2 96%     PHYSICAL EXAM:  GENERAL HEALTH: well developed, well nourished, in no apparent distress; cachectic appearance  LINES, TUBES, DRAINS:  port disease    Admit to subacute rehab,Fall and seizure precautions,palliative care consult   PT/OT  Pain control with morphine sulfate   Decadron    Finish the coarse of bactrim    Continue his out patient meds  ELOS - 2 weeks   Amos Huddleston MD   [facility]

## 2018-03-30 NOTE — PROGRESS NOTES
Erlin Ely, 10/22/1950, 78year old, male    Chief Complaint:  Patient presents with: Follow - Up: s/p fall/colon CA w/ brain mets       Subjective:   PMH significant for metastatic colon CA s/p resection/colostomy/chemo, HTN, HL, and LLE DVT/PE.   In pedal pulses 2+. +clubbing; 2+ pitting edema to pretibial region b/l.   NEUROLOGIC: intact; no sensorimotor deficit, cranial nerves intact II-XII, follows commands  PSYCHIATRIC: ---Alert and oriented x 1; pleasantly confused, affect appropriate    Medicati

## 2018-04-02 NOTE — PROGRESS NOTES
LIAN was asked to contact patient's sister Renetta regarding her request for a letter to bring to an . Patient's MD is away this week. LIAN read last note from Dr. Sarai Donovan on 3/28 stating that the patient is capable of making decisions.      LIAN left V

## 2018-04-02 NOTE — PROGRESS NOTES
Matias Ely, 10/22/1950, 78year old, male    Chief Complaint:  Patient presents with:   Follow - Up: s/p fall/colon CA w/ brain mets  Fall: multiple falls at SNF       Subjective:   PMH significant for metastatic colon CA s/p resection/colostomy/chemo, light brown stool in patch  :Deferred  LYMPHATIC:no lymphedema  MUSCULOSKELETAL: no acute synovitis upper or lower extremity.   Left shoulder w/ infiltrative mass limiting ROM and causing weakness  EXTREMITIES/VASCULAR:no cyanosis, clubbing or edema, radi

## 2018-04-03 NOTE — PROGRESS NOTES
LIAN spoke to patient's sister Chaz Buchanan. She states that her brother is at [facility], and, when she visited yesterday, he was confused, scared, and could barely stand up.   She states that she has not heard the results of the most recent testing, and s

## 2018-04-03 NOTE — PROCEDURES
ELECTROENCEPHALOGRAM REPORT      Patient Name: Adithya Hoffmann  Chart ID: [id number]  Ordering Physician:              Date of Test: 3/21/2018    A routine EEG was obtained. No sedation was given.     Dx: Syncope and Brain mets    Abnormal EEG with

## 2018-04-04 NOTE — PROGRESS NOTES
Marvin Ely, 10/22/1950, 78year old, male    Chief Complaint:  Patient presents with:   Follow - Up: s/p fall/colon CA w/ brain mets  Anxiety  Weakness  Abnormal Labs       Subjective:   PMH significant for metastatic colon CA s/p resection/colostomy/c patch  :Deferred  LYMPHATIC:no lymphedema  MUSCULOSKELETAL: no acute synovitis upper or lower extremity.   Left shoulder w/ infiltrative mass limiting ROM and causing weakness  EXTREMITIES/VASCULAR:no cyanosis, clubbing or edema, radial pulses 2+ and dors x 2 sets  3. Stat UA C&S  4. Stat stool for C Diff  5. Monitor for fever/sxs of illness    Elevated LFTs  1. Likely r/t liver mets but trending up  2. Stat abd US     UTI  1. Bactrim DS completed 4. 2.18    LE edema  1. Lasix 20 mg daily x 3 days  2.  Kit Asa

## 2018-04-06 NOTE — TELEPHONE ENCOUNTER
Therapists attempted treatment one last time. I had called [facility] and spoke to patients nurse. He was pre - medicated with Xanax and Morphine this time, however, he again became combative while on the table, and demanded to be taken off.  I will place a

## 2018-04-06 NOTE — PROGRESS NOTES
Radha Ely, 10/22/1950, 78year old, male    Chief Complaint:  Patient presents with:   Follow - Up: s/p fall/colon CA w/ brain mets  Insomnia  Abnormal Labs       Subjective:   PMH significant for metastatic colon CA s/p resection/colostomy/chemo, HTN +clubbing; 1+ pitting edema to 3/4 way up tibia b/l--improved. TEDs in place. NEUROLOGIC: intact; no sensorimotor deficit, cranial nerves intact II-XII, follows commands  PSYCHIATRIC: ---sleeping soundly.     Medications reviewed: Yes    Diagnostics revie Culture     <10,000 cfu/ml Multiple species present- probable contamination.         Resulting Agency: [facility]      Specimen Collected: 04/04/18  5:20 PM Last Resulted: 04/06/18 12:54         BLOOD CULTURE   Order: [id number]   Collected:  4/4/20 precautions; facility to attempt to arrange for sitter  3. PT/OT eval and tx  4. ELOS 14 days  5. Plan DC on or before 4.13.18; SW to assist w/ DC plan  6. MSO4 ER 15 mg BID  7. Decadron 2 mg; 2 tabs TID  8. Keppra 500 mg BID for seizure prophylaxis  9.  Co

## 2018-04-09 NOTE — TELEPHONE ENCOUNTER
Pt's sister, Fabio Ruvalcaba is requesting a call from Dr. Isabell Cheatham to discuss pt's prognosis. She can be reached at [phone]. Pt did sign up for Hospice today with [facility].      Will forward message to MD.

## 2018-04-09 NOTE — PROGRESS NOTES
Kapil Rafy Flahertyker, 10/22/1950, 78year old, male    Chief Complaint:  Patient presents with:   Follow - Up: s/p fall/colon CA w/ brain mets  Fatigue (constitutional, neurologic)  Insomnia       Subjective:   PMH significant for metastatic colon CA s/p resecti no masses; no bruits; nontender, no guarding, no rebound tenderness. +colostomy w/ soft light brown stool in patch  :Deferred  LYMPHATIC:no lymphedema  MUSCULOSKELETAL: no acute synovitis upper or lower extremity.   Left shoulder w/ infiltrative mass castillo UA C&S--possible mild UTI. 4. Bactrim /160 mg BID x 1 wk  5. Stat stool for C Diff--neg  6. Monitor for fever/sxs of illness  7. Repeat labs prn    Elevated LFTs  1. Likely r/t liver mets but trending up  2. Stat abd US--stable    LE edema  1.  Arlester Minor

## 2018-04-10 PROBLEM — Z51.5 COMFORT MEASURES ONLY STATUS: Status: ACTIVE | Noted: 2018-01-01

## 2018-04-10 NOTE — CM/SW NOTE
SW reviewed pt's chart. Prior to admission [place] [facility]  SW spoke w/[facility] who stated [facility] was set to eval the pt, but he was then transported to the hospital. SW contact pt's sister Renetta who stated she would still like the eval w/S

## 2018-04-10 NOTE — H&P
[facility]  History and Physical     Evelio Aly Patient Status:  Emergency    10/22/1950 MRN [id number]   Location [address] Attending Aleta Drew MD   Hosp Day # 0 PCP Adria Ramirez MD     Chief Complaint: [facility] mouth 2 (two) times daily. Disp:  Rfl:    morphINE Sulfate ER 15 MG Oral Tab CR Take 1 tablet (15 mg total) by mouth every 12 (twelve) hours. Disp: 10 tablet Rfl: 0   atorvastatin 40 MG Oral Tab Take 1 tablet (40 mg total) by mouth nightly.  Disp: 20 tablet 135.8 mL/min (A) (based on SCr of 0.57 mg/dL (L)). No results for input(s): PTP, INR in the last 72 hours. No results for input(s): TROP, CK in the last 72 hours. Imaging: Imaging data reviewed in Epic. ASSESSMENT / PLAN:     1.  Acute Hypoxic

## 2018-04-10 NOTE — ED PROVIDER NOTES
Patient Seen in: [facility] Emergency Department    History   Patient presents with:  Dyspnea SHELBIE SOB (respiratory)    Stated Complaint: arrived from [facility] for SHELBIE    HPI    Patient 31-year-old gentleman with metastatic colon cancer was brought of motion. Neck supple. Cardiovascular: Intact distal pulses. Tachycardia present. Pulmonary/Chest: He is in respiratory distress. He has no wheezes. He has no rales. Abdominal: Soft. He exhibits distension. There is tenderness.    Musculoskeletal:

## 2018-04-10 NOTE — ED NOTES
Spoke with Fred Leon, sister, POA for health care. Dr Ralph Osorio also spoke with [address] [place] [address]. Pt to be admitted for comfort care, does not want testing done or heroic measures.

## 2018-04-10 NOTE — PLAN OF CARE
NURSING ADMISSION NOTE      Patient admitted via cart. Oriented to room. Safety precautions initiated. Bed in low position. Call light in reach. Admitted patient from ED for Comfort care. Dr. Darline Howard gave admitting orders. Patient lethargic.  Jordin Wayne

## 2018-04-10 NOTE — PLAN OF CARE
PAIN - ADULT    • Verbalizes/displays adequate comfort level or patient's stated pain goal Progressing          Pt lethargic and drowsy this morning. VSS and no SOB observed. On 4L NC, sating at 95%. No signs of pain observed. Pt resting comfortably.  Morph

## 2018-04-10 NOTE — PROGRESS NOTES
IM addendum to Dr. Paulette Shaw H&P:    Chart reviewed, pt with metastatic colon ca admitted with worsening respiratory distress  Currently on 4 liters, lethargic  On comfort measures, ativan PRN, and morphine gtt  SW eval, hospice to see today    BP 95/65 (BP

## 2018-04-10 NOTE — CM/SW NOTE
Nursing homes asking what chemo pill the pt will be on. Rn to look into this and follow up w/SW regarding chemo medication and dosing. Nursing homes unable to determine if they will be able to accept the pt without this information.

## 2018-04-10 NOTE — ED INITIAL ASSESSMENT (HPI)
Arrived via EMS for reports of SHELBIE. Pt arrived tachypneic, 's, temp 100. Per report, pt normally alert x1.  Arrived with a DNR form

## 2018-04-11 NOTE — PROGRESS NOTES
[facility] HOSPITALIST  Progress Note     Rone Guardian Patient Status:  Observation    10/22/1950 MRN [id number]   [facility] 4NW-A Attending Dorie Burton MD   Hosp Day # 0 PCP Tita Marquez MD     Chief Complaint: confusion    S: Patient Mayo Cee

## 2018-04-11 NOTE — PROGRESS NOTES
Patient on continued morphine pca at reg rates,lethargic but seems comfortable,  Colostomy w/ small output,Repositioned, O2 for comfort, Hospice to see patient.

## 2018-04-11 NOTE — HOSPICE RN NOTE
I met with sister and EDWIGE, we discussed pt and hospice, sister will not sign anything without her  looking at it first, I gave her the book with the consents inside for review. Pt is a DNR and comfort measures now.  Home: [facility].  Anita [phone]

## 2018-04-11 NOTE — PLAN OF CARE
PAIN - ADULT    • Verbalizes/displays adequate comfort level or patient's stated pain goal Progressing        Pt lethargic, drowsy. Appears comfortable. Respiratory irregular but non-labored. On 4 LNC sating at 90-92%.  Colostomy with moderate yellowish barbara

## 2018-04-12 PROBLEM — R06.03 RESPIRATORY DISTRESS: Status: ACTIVE | Noted: 2018-01-01

## 2018-04-12 NOTE — PLAN OF CARE
Pt. Lethargic and drowsy. Appears comfortable. Pt. Repositioned Q2 hrs. And made comfortable. Pt. Febrile overnight with at temp of 102.1; PRN Tylenol suppository given with effective results. Respirations irregular/non-labored. Pt.  On 4L O2 via NC; sats a

## 2018-04-12 NOTE — PROGRESS NOTES
[facility] HOSPITALIST  Progress Note     Regine Gilliland Patient Status:  Inpatient    10/22/1950 MRN [id number]   [facility] 4NW-A Attending Alisson Bianchi MD   Hosp Day # 0 PCP Sara Brito MD     Chief Complaint: confusion    S: Patient is ve Initial Certification    Patient will require inpatient services that will reasonably be expected to span two midnight's based on the clinical documentation in H+P.    Based on patients current state of illness, I anticipate that, after discharge, patient w

## 2018-04-12 NOTE — PLAN OF CARE
Assumed care @ 0730. Patient unresponsive, respirations 20, slightly labored. Patient on Morphine drip 2 mg/hr. Patient appears comfortable.

## 2018-04-13 NOTE — CM/SW NOTE
SW spoke w/[facility] who stated the pt's sister Renetta refused to sign consents. At this time, pt seems too unstable to transfer. Pt to remain on comfort care at this time.

## 2018-04-13 NOTE — PROGRESS NOTES
04/12/18 3937   Clinical Encounter Type   Visited With Patient   Routine Visit Introduction   Continue Visiting Yes  (If requested)   Crisis Visit Patient actively dying  (Per RN)   Patient Spiritual Encounters   Spiritual Assessment Completed 2   Spiri

## 2018-04-13 NOTE — PROGRESS NOTES
[address]    PATIENT'S NAME: Shepherd Jose G   RADIATION ONCOLOGIST: Kristin Jennings. Quang Campbell M.D.    PATIENT ACCOUNT #: [de-identified] Gail Pompa   [facility]   MEDICAL RECORD #: [id number] YOB: 1950   DATE: 04/06/2018       RADIATION ONCOLOGY T brain metastases and to help give some degree of pain relief to his shoulder.   However, we were only able to get 1 field of treatment done to his initial whole brain therapy before he became quite uncomfortable and wished to discontinue therapy at that [facility]

## 2018-04-13 NOTE — PROGRESS NOTES
Received pt on continous morphine gtt at 2 mg/hr,Patient on comfort care,labored breathing,  1345,Patient found unresponsive,no pulse,No blood pressure reading,Charge nurse notified.   Pronounced  Dead at 1345,[facility] notified,on hold for eye donor per eyeban

## 2018-04-13 NOTE — PROGRESS NOTES
04/13/18 1214   Clinical Encounter Type   Visited With Patient   Routine Visit Follow-up   Sacramental Encounters   Sacrament of Sick-Anointing Anointed   The patient was seen by Tesha Moore.  Received prayer, Scripture, support and [employer]

## 2018-04-13 NOTE — PROGRESS NOTES
04/13/18 1541   Clinical Encounter Type   Visited With Patient   Routine Visit Follow-up   Sacramental Encounters   Sacrament of Sick-Anointing Anointed   The patient was seen by Anil Fagan.  Received prayer, Scripture, support and [employer]

## 2018-04-13 NOTE — PROGRESS NOTES
[facility] HOSPITALIST  Progress Note     Roro Number Patient Status:  Inpatient    10/22/1950 MRN [id number]   [facility] 4NW-A Attending Damir Theodore MD   Hosp Day # 1 PCP Edgardo Martinez MD     Chief Complaint: drowsiness    S: Patient is l

## 2018-04-13 NOTE — CM/SW NOTE
PCSW received phone call from [facility] [phone] inquiring about pt. PCSW paged Unit LIAN/Iveth and advised of above for follow-up.

## 2018-04-19 NOTE — DISCHARGE SUMMARY
[facility] HOSPITAL[facility]  DISCHARGE SUMMARY     Anh Ely Patient Status:  Inpatient    10/22/1950 MRN [id number]   [facility] 4NW-A Attending No att. providers found   Hosp Day # 1 PCP Samantha Mendes MD     Date of Admission: 4/10/2018  Date mouth 3 (three) times daily as needed for Sleep or Anxiety. Refills:  0     AmLODIPine Besylate 5 MG Tabs  Commonly known as:  NORVASC      Take 1 tablet (5 mg total) by mouth once daily.    Quantity:  60 tablet  Refills:  2     atorvastatin 40 MG Tabs  C extremities. Extremities: No edema.   -----------------------------------------------------------------------------------------------  PATIENT DISCHARGE INSTRUCTIONS: See electronic chart    Teresa Monzon MD 4/13/2018    Time spent:  > 30 minutes

## 2019-07-02 NOTE — PROGRESS NOTES
Education Record  Learner:  Patient  Disease / Diagnosis:   Colon cancer  Barriers / Limitations:  None  Method:  Printed material and Reinforcement  General Topics:  Plan of care reviewed; appointments - seeing Damir Red NP next week.   Outcome no

## 2022-08-09 NOTE — TELEPHONE ENCOUNTER
[place] to [facility] spoke with RN Fabián Alfaro. Instructed we have faxed over a copy of the patients radiation schedule. Discussed patients difficulty with completing treatment today due to pain.  Asked for patent to be medicated 30-60 min prior to each daily RT se 97.7

## (undated) NOTE — MR AVS SNAPSHOT
After Visit Summary   4/5/2017    James Dennys    MRN: [id number]           Diagnoses this Visit     Cancer of rectosigmoid (colon) [facility])    -  Primary     Malignant neoplasm of colon, unspecified part of colon [facility])         Colon cancer metastas If you have questions, you can call [phone] to talk to our [facility] Staff. Remember, [employer] is NOT to be used for urgent needs. For medical emergencies, dial 911. Visit [email or web address] to learn more.

## (undated) NOTE — MR AVS SNAPSHOT
[facility] SUR[facility]  [address] [place] [address] [phone]               Thank you for choosing us for your health care visit with [address] [facility],[address], PT.   We are glad to serve you and happy to provide you with this summary o Take 1 tablet (5 mg total) by mouth daily. Commonly known as:  Pargi 72 OR   Take  by mouth. Clindamycin Phosphate 1 % Lotn   Apply to affected areas twice daily.    Commonly known as:  CLEOCIN T           magnesium oxide

## (undated) NOTE — IP AVS SNAPSHOT
Patient Demographics     Address  [facility] [address] [facility] [address] Phone  [phone] [facility]) *Preferred*  [phone] [facility]) E-mail Address  [email or web address]      Emergency Contact(s)     Name Relation Home Work Mobile    Estrada Boo These medications were sent to THE [facility] AT [address], [address], 373 25 850, [address] [facility]    Phone:  [phone]   atorvastatin 40 MG Tabs  dexamethasone 4 MG tablet  haloperidol 1 MG Tabs  Sulfameth [id number] levETIRAcetam (KEPPRA) tab 500 mg 03/26/18 1754 Given      [id number] levETIRAcetam (KEPPRA) tab 500 mg 03/27/18 0428 Given            LEFT LOWER ARM     Order ID Medication Name Action Time Action Reason Comments    [id number] Insulin Aspart Pen History of Present Illness: Carmen Veloz is a 78year old male with medical history of colon cancer with metastases to the liver and essential hypertension comes to the ER via EMS after a fall at approximately 3:00 this morning.   Patient initially compl Pertinent positives and negatives noted in the HPI. Physical Exam:    /75   Pulse 86   Temp 97.7 °F (36.5 °C) (Temporal)   Resp 24   Ht 5' 9\" (1.753 m)   Wt 178 lb (80.7 kg)   SpO2 95%   BMI 26.29 kg/m²   General: No acute distress.  Alert and anabelle 1. Due to mets  4. Normocytic anemia  5.  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     In October 2015, he developed a LLE DVT and was started on Xarelto.     He had a delay in therapy in Early 2016 due to an extended trip to the [facility].  Prior • Unspecified essential hypertension      Past Surgical History:  No date: COLONOSCOPY  No date: NEEDLE BIOPSY LIVER  No date: OTHER      Comment: teeth extraction  1/2016: OTHER SURGICAL HISTORY      Comment: left shoulder biopsy  No date: PART REMOVAL CO •  ondansetron HCl (ZOFRAN) injection 4 mg, 4 mg, Intravenous, Q6H PRN **OR** Metoclopramide HCl (REGLAN) injection 10 mg, 10 mg, Intravenous, Q8H PRN  •  famoTIDine (PEPCID) tab 20 mg, 20 mg, Oral, Daily **OR** famoTIDine (PEPCID) injection 20 mg, 20 mg, CO2 26.0 03/21/2018   GLU 90 03/21/2018   CA 8.9 03/21/2018   ALB 2.5 03/21/2018   ALKPHO 917 03/21/2018   BILT 4.3 03/21/2018   TP 6.6 03/21/2018    03/21/2018   ALT 49 03/21/2018   PTT 38.8 03/21/2018   INR 1.31 03/21/2018   TROP <0.046 03/21/2018 Other acute pulmonary embolism without acute cor pulmonale (HCC)     Malignant neoplasm of colon (HCC)     HTN, goal below 130/80     Bone metastases ([facility] [address]     Palliative care by specialist     Anemia     Syncope and collapse     Nontraumatic subcortica radiation to both lesions may be reasonable. Thank you for allowing me to participate in the care of your patient.     Leighton Bello  3/21/2018  7:36 PM[WB.1]    Electronically signed by Song Tejada MD on 3/21/2018  7:53 PM   Attribution will benefit from further cognitive communication evaluation and treatment at subacute rehab. Discussed with patient who was in agreement.      Assessment(s) Administered: SLUMS                Aphasic Depression Rating Scale Administered: Not applicable  D If you have any questions please contact Therese Jean MS CCC-SLP  Pager [phone][JL.1]        Electronically signed by Kaila Brantley on 3/23/2018  2:43 PM   Attribution [place]    JL. 1 - Kaila Brantley on 3/23/2018  2:34 PM

## (undated) NOTE — MR AVS SNAPSHOT
After Visit Summary   1/11/2017    Ama Foreman    MRN: [id number]           Diagnoses this Visit     Colon cancer metastasized to liver [facility])    -  Primary       Allergies     No Known Allergies      Your Vital Signs Were     Smoking Status

## (undated) NOTE — MR AVS SNAPSHOT
After Visit Summary   3/8/2017    Artist Som    MRN: [id number]           Diagnoses this Visit     Colon cancer metastasized to liver [facility])    -  Primary     Chronic deep vein thrombosis (DVT) of proximal vein of left lower extremity ([facility] [address]

## (undated) NOTE — IP AVS SNAPSHOT
[facility] [place] [address] [place], [address] ~ [phone]                Discharge Summary   4/10/2017    [facility]           Admission Information        Provider Department    4/10/2017 DO Kyle [facility] [address] Phone:  [phone]    - rivaroxaban 15 MG Tabs            Follow-up Information     Follow up with Vivian Almonte MD On 4/21/2017.     Specialties:  Family Medicine, IP Consult to Primary Care    Why:  Follow up with Dr. Yoly Montes on 4/21 at 10:00    Conta Abs Final Neut Abs Lymphocyte Abso Monocyte Absolu Eosinophil Abso Basophil Absolu    (04/10/17)  72.8 (04/10/17)  15.4 (04/10/17)  8.7 (04/10/17)  2.3 (04/10/17)  0.4 -- (04/10/17)  7.33 (H) (04/10/17)  1.55 (04/10/17)  0.88 (H) (04/10/17)  0.23 (04/10/17 can help with your Affordable Care Act coverage, as well as all types of Medicaid plans. To get signed up and covered, please call [phone] and ask to get set up for an insurance coverage that is in-network with Rafia Hensley

## (undated) NOTE — MR AVS SNAPSHOT
After Visit Summary   3/8/2017    Morelia Palomares    MRN: [id number]           Diagnoses this Visit     Cancer of rectosigmoid (colon) [facility])    -  Primary     Malignant neoplasm of colon, unspecified part of colon [facility])         Colon cancer metastas Appointment with George Willard at [facility] in [place] [phone])   [address]       Wednesday April 05, 2017 9:15 AM     Appointment with DONA Urban at [facility] in [place] ([phone] Component Value Flag Ref Range Units Status    Magnesium 0.8 (LL) 1.7-3.0  mg/dL Final         Result Summary for CBC W/ DIFFERENTIAL      Component Results     Component Value Flag Ref Range Units Status    WBC 9.4  4.0-13.0  x10(3) uL Final    RBC 4.25

## (undated) NOTE — MR AVS SNAPSHOT
After Visit Summary   2/22/2017    Raul Campoverde    MRN: [id number]           Diagnoses this Visit     Cancer of rectosigmoid (colon) [facility])    -  Primary     Malignant neoplasm of colon, unspecified part of colon [facility])         Colon cancer metasta Appointment with Mic Santillan at [facility] in [place] ([phone] [address]  [address]       Tuesday March 07, 2017 9:30 AM     Appointment with Mic Santillan at [facility] in [place] ([phone]) Estimated GFR units: mL/min/1.73 square meters   eGFR calculated by the CKD-EPI equation. Calcium, Total 8.6  8.3-10.3  mg/dL Final    Comment:       Total Calciums are not corrected for effects of low albumin.  If needed, use the following correctio Eosinophil % 4.0   % Final    Basophil % 0.5   % Final    Immature Granulocyte % 0.2   % Final               MyChart

## (undated) NOTE — MR AVS SNAPSHOT
After Visit Summary   5/10/2017    Romana Curling    MRN: [id number]           Diagnoses this Visit     Overlapping malignant neoplasm of colon [facility])    -  Primary     Can[facility]oid [facility]         Malignant neoplasm of colon, unspec Creatinine 0.56 (L) 0.70-1.30  mg/dL Final      >=60   Final    Comment:       Estimated GFR units: mL/min/1.73 square meters   eGFR calculated by the CKD-EPI equation.         Calcium, Total 8.3  8.3-10.3  mg/dL Final    Comment:       Total Calciu Immature Granulocyte % 0.2   % Final         Result Summary for MAGNESIUM      Component Results     Component Value Flag Ref Range Units Status    Magnesium 1.1 (L) 1.7-3.0  mg/dL Final               MyChart     Call the helpdesk for assistance with your

## (undated) NOTE — MR AVS SNAPSHOT
After Visit Summary   2/8/2017    Carly Tabatha    MRN: [id number]           Diagnoses this Visit     Cancer of rectosigmoid (colon) [facility])    -  Primary     Malignant neoplasm of colon, unspecified part of colon [facility])         Colon cancer metastas Please view results for these tests on the individual orders.          Result Summary for COMP METABOLIC PANEL (14)      Component Results     Component Value Flag Ref Range Units Status    Glucose 99  70-99  mg/dL Final    BUN 17  8-20  mg/dL Final    Crea Monocyte Absolute 0.88 (H) 0.10-0.60  x10(3) uL Final    Eosinophil Absolute 0.37 (H) 0.00-0.30  x10(3) uL Final    Basophil Absolute 0.06  0.00-0.10  x10(3) uL Final    Immature Granulocyte Absolute 0.02  0.00-1.00  x10(3) uL Final    Neutrophil % 62.6

## (undated) NOTE — MR AVS SNAPSHOT
After Visit Summary   5/24/2017    Benigno Carrillo    MRN: [id number]           Diagnoses this Visit     Overlapping malignant neoplasm of colon [facility])    -  Primary     Liver metastases ([facility] [address]         Other acute pulmonary embolism without acute cor

## (undated) NOTE — IP AVS SNAPSHOT
[address]            (For Outpatient Use Only) Initial Admit Date: 3/21/2018   Inpt/Obs Admit Date: Inpt: 3/21/18 / Obs: N/A   Discharge Date:    Hospital Acct:  [de-identified]   MRN: [de-identified]   CSN: [id number]        ENCOUNTER  Patient Hospital Account Financial Class: Medicare    March 27, 2018

## (undated) NOTE — MR AVS SNAPSHOT
After Visit Summary   4/5/2017    Benigno Carrillo    MRN: [id number]           Diagnoses this Visit     Cancer of rectosigmoid (colon) [facility])    -  Primary     Malignant neoplasm of colon, unspecified part of colon [facility])         Colon cancer metastas Total Calciums are not corrected for effects of low albumin. If needed, use the following correction formula. Corrected Calcium Formula:      ((4.0 - Albumin) x 0.8 + Calcium    Note: Calculation is only valid when Albumin is less than 4.0g/dL.       A

## (undated) NOTE — MR AVS SNAPSHOT
[facility] AND SURGICAL HOSPITAL  [address] [place] [address] [phone]               Thank you for choosing us for your health care visit with [address] [facility], PT.   We are glad to serve you and happy to provide you with this summary o Take 1 tablet (5 mg total) by mouth daily. Commonly known as:  [address] 72 OR   Take  by mouth. Clindamycin Phosphate 1 % Lotn   Apply to affected areas twice daily.    Commonly known as:  CLEOCIN T           magnesium oxide

## (undated) NOTE — LETTER
Printed: 2017    Patient Name: Carmen Veloz  : 10/22/1950   Medical Record #: [id number]    Consent to Chemotherapy    I, Octavio Ely, understand that I have been diagnosed with metastatic colon cancer.     I understand that the treatment s

## (undated) NOTE — MR AVS SNAPSHOT
After Visit Summary   4/19/2017    Esther Carlson    MRN: [id number]           Diagnoses this Visit     Cancer of rectosigmoid (colon) [facility])    -  Primary     Malignant neoplasm of colon, unspecified part of colon [facility])         Colon cancer metasta ---------                               -----------         ------                     CBC W/ DIFFERENTIAL[[id number]]          Abnormal            Final result                 Please view results for these tests on the individual orders.          Result Sum Note: Calculation is only valid when Albumin is less than 4.0g/dL.       Alkaline Phosphatase 178 (H)   U/L Final    AST 50 (H) 15-41  U/L Final    Alt 27  17-63  U/L Final    Bilirubin, Total 0.8  0.1-2.0  mg/dL Final    Total Protein 7.0  6.1-8.3

## (undated) NOTE — MR AVS SNAPSHOT
After Visit Summary   6/21/2017    Morelia Palomares    MRN: [id number]           Diagnoses this Visit     Overlapping malignant neoplasm of colon [facility])    -  Primary     Cancer of rectosigmoid (colon) ([email or web address]         Malignant neoplasm of colon, unspec Result Summary for COMP METABOLIC PANEL (14)      Component Results     Component Value Flag Ref Range Units Status    Glucose 115 (H) 70-99  mg/dL Final    BUN 12  8-20  mg/dL Final    Creatinine 0.68 (L) 0.70-1.30  mg/dL Final      >=60   Final Immature Granulocyte Absolute 0.01  0.00-1.00  x10(3) uL Final    Neutrophil % 51.8   % Final    Lymphocyte % 26.4   % Final    Monocyte % 18.0   % Final    Eosinophil % 3.2   % Final    Basophil % 0.4   % Final    Immature Granulocyte % 0.2   % Final

## (undated) NOTE — MR AVS SNAPSHOT
[address] [address] [phone]               Thank you for choosing us for your health care visit with Carmela Gan MD.  We are glad to serve you and happy to provide you with this summary of your Ondansetron HCl 8 MG tablet   Take 1 tablet (8 mg total) by mouth every 8 (eight) hours as needed for Nausea.    Commonly known as:  ZOFRAN           Prochlorperazine Maleate 10 mg tablet   Take 1 tablet (10 mg total) by mouth every 6 (six) hours as needed

## (undated) NOTE — MR AVS SNAPSHOT
After Visit Summary   5/10/2017    Esther Carlson    MRN: [id number]           Diagnoses this Visit     Overlapping malignant neoplasm of colon [facility])    -  Primary     Can[facility]osigmoid [facility]colon[facility]         Malignant neoplasm of colon, unspec Wednesday May 24, 2017 8:15 AM     Appointment with DONA Camacho at [facility] in [place] [address])   [address] [employer]     Call the [employer] for assistance with your inactive [employer] account    If

## (undated) NOTE — MR AVS SNAPSHOT
After Visit Summary   1/11/2017    Wendy Melton    MRN: [id number]           Diagnoses this Visit     Cancer of rectosigmoid (colon) [facility])    -  Primary     Malignant neoplasm of colon, unspecified part of colon [facility])         Colon cancer metasta Please view results for these tests on the individual orders.          Result Summary for COMP METABOLIC PANEL (14)      Component Results     Component Value Flag Ref Range Units Status    Glucose 130 (H) 70-99  mg/dL Final    BUN 10  8-20  mg/dL Final Neutrophil Absolute Prelim 5.25  1.30-6.70  x10 (3) uL Final    Neutrophil Absolute 5.25  1.30-6.70  x10(3) uL Final    Lymphocyte Absolute 1.92  0.90-4.00  x10(3) uL Final    Monocyte Absolute 0.75 (H) 0.10-0.60  x10(3) uL Final    Eosinophil Absolute 0.

## (undated) NOTE — MR AVS SNAPSHOT
[facility] HE[facility] AND SURGICAL H[facility]PITAL  [address] [place] [address]  [phone]               Thank you for choosing us for your health care visit with [address] [facility], PT.   We are glad to serve you and happy to provide you with this summary o magnesium oxide 400 MG Tabs   Take 400 mg by mouth daily. Commonly known as:  MAG-OX           Ondansetron HCl 8 MG tablet   Take 1 tablet (8 mg total) by mouth every 8 (eight) hours as needed for Nausea.    Commonly known as:  Marylou Khalil

## (undated) NOTE — MR AVS SNAPSHOT
After Visit Summary   3/22/2017    Ana Barbosa    MRN: [id number]           Diagnoses this Visit     Cancer of rectosigmoid (colon) [facility])    -  Primary     Malignant neoplasm of colon, unspecified part of colon [facility])         Colon cancer metasta LAB:  COMP METABOLIC PANEL (14)        Wednesday March 22, 2017     LAB:  CREATININE, SERUM        Wednesday March 22, 2017     LAB:  MAGNESIUM        Wednesday April 05, 2017 9:00 AM     Appointment with Song Tejada at [facility] in [place] Potassium 3.8  3.6-5.1  mmol/L Final    Chloride 109  101-111  mmol/L Final    CO2 27.0  22.0-32.0  mmol/L Final         Result Summary for MAGNESIUM      Component Results     Component Value Flag Ref Range Units Status    Magnesium 1.0 (L) 1.7-3.0  mg/d

## (undated) NOTE — MR AVS SNAPSHOT
After Visit Summary   4/19/2017    Victor M Hiens    MRN: [id number]           Diagnoses this Visit     Routine lab draw    -  Primary     Hypomagnesemia           Allergies     No Known Allergies      Your Vital Signs Were     BP Pulse Temp(Src) R

## (undated) NOTE — MR AVS SNAPSHOT
After Visit Summary   5/24/2017    Esther Carlson    MRN: [id number]           Diagnoses this Visit     Malignant neoplasm of colon, unspecified part of colon [facility])    -  Primary     Overlapping malignant neoplasm of colon ([facility]         Cancer of r Procedure                               Abnormality         Status                     ---------                               -----------         ------                     CBC W/ DIFFERENTIAL[[id number]]          Abnormal            Final result Neutrophil Absolute Prelim 2.22  1.30-6.70  x10 (3) uL Final    Neutrophil Absolute 2.22  1.30-6.70  x10(3) uL Final    Lymphocyte Absolute 1.42  0.90-4.00  x10(3) uL Final    Monocyte Absolute 0.87 (H) 0.10-0.60  x10(3) uL Final    Eosinophil Absolute 0.

## (undated) NOTE — MR AVS SNAPSHOT
After Visit Summary   6/7/2017    Juventino Sravani    MRN: [id number]           Diagnoses this Visit     Overlapping malignant neoplasm of colon [facility])    -  Primary     [facility]         Malignant neoplasm of colon, unspeci ---------                               -----------         ------                     CBC W/ DIFFERENTIAL[[id number]]          Abnormal            Final result                 Please view results for these tests on the individual orders.          Result Sum Lymphocyte Absolute 1.24  0.90-4.00  x10(3) uL Final    Monocyte Absolute 0.67 (H) 0.10-0.60  x10(3) uL Final    Eosinophil Absolute 0.14  0.00-0.30  x10(3) uL Final    Basophil Absolute 0.03  0.00-0.10  x10(3) uL Final    Immature Granulocyte Absolute 0.

## (undated) NOTE — MR AVS SNAPSHOT
[facility]  [address] [place] [id number]  [phone]               Thank you for choosing us for your health care visit with [address] [facility],[address], [place].   We are glad to serve you and happy to provide you with this summary o Take 1 tablet (5 mg total) by mouth daily. Commonly known as:  Pargi 72 OR   Take  by mouth. Clindamycin Phosphate 1 % Lotn   Apply to affected areas twice daily.    Commonly known as:  CLEOCIN T           magnesium oxide

## (undated) NOTE — MR AVS SNAPSHOT
After Visit Summary   1/25/2017    Kathleen Will    MRN: [id number]           Diagnoses this Visit     Cancer of rectosigmoid (colon) [facility])    -  Primary     Liver metastases ([facility]         Chronic deep vein thrombosis (DVT) of proximal vein of lef

## (undated) NOTE — MR AVS SNAPSHOT
After Visit Summary   4/26/2017    Carly Tabatha    MRN: [id number]           Diagnoses this Visit     Hypomagnesemia    -  Primary     Overlapping malignant neoplasm of colon [facility])         Other acute pulmonary embolism without acute cor pulmonal medical emergencies, dial 911. Visit [email or web address] to learn more.

## (undated) NOTE — MR AVS SNAPSHOT
After Visit Summary   4/26/2017    Adithya Hoffmann    MRN: [id number]           Diagnoses this Visit     Overlapping malignant neoplasm of colon [facility])    -  Primary     Can[facility]oid [facility] ([facility]         Malignant neoplasm of colon, unspec CBC W/ DIFFERENTIAL[[id number]]          Abnormal            Final result                 Please view results for these tests on the individual orders.          Result Summary for COMP METABOLIC PANEL (14)      Component Results     Component Value Flag Ref Monocyte Absolute 1.55 (H) 0.10-0.60  x10(3) uL Final    Eosinophil Absolute 0.19  0.00-0.30  x10(3) uL Final    Basophil Absolute 0.04  0.00-0.10  x10(3) uL Final    Immature Granulocyte Absolute 0.03  0.00-1.00  x10(3) uL Final    Neutrophil % 69.9   %

## (undated) NOTE — MR AVS SNAPSHOT
After Visit Summary   6/21/2017    Jethro Marie    MRN: [id number]           Diagnoses this Visit     Overlapping malignant neoplasm of colon [facility])    -  Primary     Other chronic pulmonary embolism without acute cor pulmonale ([facility]         Bone

## (undated) NOTE — MR AVS SNAPSHOT
After Visit Summary   4/28/2017    Timothy Edwards    MRN: [id number]           Diagnoses this Visit     Cancer of rectosigmoid (colon) [facility])    -  Primary       Allergies     No Known Allergies      Your Vital Signs Were     Smoking Status

## (undated) NOTE — MR AVS SNAPSHOT
After Visit Summary   6/7/2017    Júnior Tierney    MRN: [id number]           Diagnoses this Visit     Cancer of rectosigmoid (colon) [facility])    -  Primary     Overlapping malignant ne[facility] ([facility]         Malignant neoplasm of colon, unspeci

## (undated) NOTE — MR AVS SNAPSHOT
After Visit Summary   5/12/2017    Nicolas Joaquín    MRN: [id number]           Diagnoses this Visit     Cancer of rectosigmoid (colon) [facility])    -  Primary       Allergies     No Known Allergies      Your Vital Signs Were     Smoking Status

## (undated) NOTE — MR AVS SNAPSHOT
After Visit Summary   2/8/2017    Artist Som    MRN: [id number]           Diagnoses this Visit     Cancer of rectosigmoid (colon) [facility])    -  Primary     Malignant neoplasm of colon, unspecified part of colon [facility])         Colon cancer metastas [facility] [id number]       Wednesday March 08, 2017 10:00 AM     Appointment with DONA Samuels at [facility] in [place] [phone]   [address] [place]